# Patient Record
Sex: FEMALE | Race: WHITE | NOT HISPANIC OR LATINO | Employment: OTHER | URBAN - NONMETROPOLITAN AREA
[De-identification: names, ages, dates, MRNs, and addresses within clinical notes are randomized per-mention and may not be internally consistent; named-entity substitution may affect disease eponyms.]

---

## 2020-01-15 ENCOUNTER — OFFICE VISIT (OUTPATIENT)
Dept: CARDIOLOGY | Facility: CLINIC | Age: 60
End: 2020-01-15

## 2020-01-15 VITALS
OXYGEN SATURATION: 96 % | HEIGHT: 62 IN | WEIGHT: 107.8 LBS | DIASTOLIC BLOOD PRESSURE: 69 MMHG | SYSTOLIC BLOOD PRESSURE: 112 MMHG | HEART RATE: 95 BPM | BODY MASS INDEX: 19.84 KG/M2

## 2020-01-15 DIAGNOSIS — R00.2 PALPITATIONS: ICD-10-CM

## 2020-01-15 DIAGNOSIS — R06.02 SHORTNESS OF BREATH: ICD-10-CM

## 2020-01-15 DIAGNOSIS — R07.2 PRECORDIAL PAIN: Primary | ICD-10-CM

## 2020-01-15 PROCEDURE — 93000 ELECTROCARDIOGRAM COMPLETE: CPT | Performed by: PHYSICIAN ASSISTANT

## 2020-01-15 PROCEDURE — 99204 OFFICE O/P NEW MOD 45 MIN: CPT | Performed by: PHYSICIAN ASSISTANT

## 2020-01-15 RX ORDER — MONTELUKAST SODIUM 10 MG/1
1 TABLET ORAL NIGHTLY
COMMUNITY
Start: 2019-11-06

## 2020-01-15 RX ORDER — OMEPRAZOLE 20 MG/1
1 CAPSULE, DELAYED RELEASE ORAL DAILY
COMMUNITY
Start: 2019-12-27

## 2020-01-15 RX ORDER — CHOLECALCIFEROL (VITAMIN D3) 125 MCG
1 CAPSULE ORAL DAILY
COMMUNITY
Start: 2019-12-06

## 2020-01-15 RX ORDER — TRIAMCINOLONE ACETONIDE 1 MG/G
1 CREAM TOPICAL 2 TIMES DAILY
COMMUNITY
End: 2023-03-09 | Stop reason: ALTCHOICE

## 2020-01-15 RX ORDER — LOPERAMIDE HYDROCHLORIDE 2 MG/1
1 CAPSULE ORAL DAILY PRN
COMMUNITY
Start: 2019-12-17

## 2020-01-15 RX ORDER — FEXOFENADINE HCL 180 MG/1
1 TABLET ORAL DAILY
COMMUNITY
Start: 2019-11-06 | End: 2023-03-09 | Stop reason: ALTCHOICE

## 2020-01-15 NOTE — PROGRESS NOTES
Subjective   Nikki Nguyen is a 59 y.o. female     Chief Complaint   Patient presents with   • Establish Care     Here to establish care        HPI    Patient is a 59-year-old female who is being referred to the office in the setting of chest pain.  Patient has a history of coronary disease.  She describes having a stress test in the distant past but has not had any further cardiac evaluation in regards to invasive evaluation.    Patient describes that she has been having atypical chest pain.  She has occasional pleuritic type of chest discomfort.  With inspiration she will notice a sharp pain.  Patient also describes after eating meals that she will notice discomfort when swallowing.  Patient describes not seeing GI.  Dyspnea is mild.  No progressive shortness of breath.  She has normal functional status.  No PND orthopnea.    She also describes palpitations.  These occur at random and described as a forceful heartbeat.  No dizziness presyncope or syncope.  Otherwise patient is doing well      Current Outpatient Medications   Medication Sig Dispense Refill   • Cholecalciferol (VITAMIN D3) 50 MCG (2000 UT) tablet Take 1 tablet by mouth Daily.     • estradiol (CLIMARA) 0.025 MG/24HR patch Place 1 patch on the skin as directed by provider 1 (One) Time Per Week.     • fexofenadine (ALLEGRA) 180 MG tablet Take 1 tablet by mouth Daily.     • Fluticasone Furoate-Vilanterol (BREO ELLIPTA) 100-25 MCG/INH inhaler Inhale 1 puff Daily.     • Lactobacillus (PROBIOTIC GOLD EXTRA STRENGTH PO) Take 1 tablet by mouth 2 (Two) Times a Day.     • loperamide (IMODIUM) 2 MG capsule Take 1 capsule by mouth Daily As Needed.     • montelukast (SINGULAIR) 10 MG tablet Take 1 tablet by mouth Every Night.     • omeprazole (priLOSEC) 20 MG capsule Take 1 capsule by mouth Daily.     • Prenatal Vit-Fe Fumarate-FA (PRENATAL 19 PO) Take 1 tablet by mouth Daily.     • triamcinolone (KENALOG) 0.1 % cream Apply 1 application topically to the  appropriate area as directed 2 (Two) Times a Day.       No current facility-administered medications for this visit.        Patient has no known allergies.    Past Medical History:   Diagnosis Date   • Acid reflux    • Asthma        Social History     Socioeconomic History   • Marital status:      Spouse name: Not on file   • Number of children: Not on file   • Years of education: Not on file   • Highest education level: Not on file   Tobacco Use   • Smoking status: Never Smoker   • Smokeless tobacco: Never Used   Substance and Sexual Activity   • Alcohol use: Never     Frequency: Never   • Drug use: Never   • Sexual activity: Defer       Family History   Problem Relation Age of Onset   • Hypertension Mother    • Diabetes Mother    • Heart disease Mother    • Cirrhosis Father    • Prostate cancer Brother        Review of Systems   Constitutional: Positive for fatigue (stays tired ). Negative for chills and fever.   HENT: Negative.    Eyes: Positive for visual disturbance (glasses daily ).   Respiratory: Positive for cough, chest tightness and shortness of breath (SOA with exertion ).    Cardiovascular: Positive for chest pain (pain and tightness in mid chest for the last week ), palpitations (Racing at times in the last few weeks ) and leg swelling (Puffiness last week, better now ).   Gastrointestinal: Positive for abdominal pain (upper abdominal pain ). Negative for blood in stool, nausea and vomiting.   Endocrine: Negative.  Negative for cold intolerance and heat intolerance.   Genitourinary: Negative.  Negative for dysuria, frequency, hematuria and urgency.   Musculoskeletal: Positive for back pain (low back pain ). Negative for arthralgias.   Skin: Positive for rash (neck and upper back which itches. Currently using Triamcinolone cream on it ). Negative for wound.   Allergic/Immunologic: Positive for environmental allergies. Negative for food allergies.   Neurological: Positive for weakness (off and on  "). Negative for dizziness and light-headedness.   Hematological: Bruises/bleeds easily (bruises easily ).   Psychiatric/Behavioral: Positive for sleep disturbance (has awaken with smothering a few times).   All other systems reviewed and are negative.      Objective   Vitals:    01/15/20 1351   BP: 112/69   BP Location: Left arm   Patient Position: Sitting   Pulse: 95   SpO2: 96%   Weight: 48.9 kg (107 lb 12.8 oz)   Height: 157.5 cm (62\")      /69 (BP Location: Left arm, Patient Position: Sitting)   Pulse 95   Ht 157.5 cm (62\")   Wt 48.9 kg (107 lb 12.8 oz)   SpO2 96%   BMI 19.72 kg/m²     Lab Results (most recent)     None          Physical Exam   Constitutional: She is oriented to person, place, and time. She appears well-developed and well-nourished. No distress.   HENT:   Head: Normocephalic and atraumatic.   Eyes: Conjunctivae are normal. Right eye exhibits no discharge. Left eye exhibits no discharge. No scleral icterus.   Neck: No JVD present.   Cardiovascular: Normal rate, regular rhythm and normal heart sounds. Exam reveals no gallop and no friction rub.   No murmur heard.  Pulmonary/Chest: Effort normal and breath sounds normal. No respiratory distress. She has no wheezes. She has no rales. She exhibits no tenderness.   Musculoskeletal: Normal range of motion. She exhibits no edema.   Neurological: She is alert and oriented to person, place, and time. No cranial nerve deficit.   Skin: Skin is warm and dry. No rash noted. No erythema. No pallor.   Psychiatric: She has a normal mood and affect. Her behavior is normal.   Nursing note and vitals reviewed.      Procedure     ECG 12 Lead  Date/Time: 1/15/2020 2:08 PM  Performed by: Fidel Moe PA  Authorized by: Fidel Moe PA   Previous ECG: no previous ECG available  Comments: EKG demonstrates sinus rhythm at 93 bpm with no acute ST changes                 Assessment/Plan     Problems Addressed this Visit        Cardiovascular and " Mediastinum    Palpitations    Relevant Orders    Adult Transthoracic Echo Complete W/ Cont if Necessary Per Protocol    Cardiac Event Monitor    Treadmill Stress Test       Respiratory    Shortness of breath    Relevant Orders    Adult Transthoracic Echo Complete W/ Cont if Necessary Per Protocol    Cardiac Event Monitor    Treadmill Stress Test       Nervous and Auditory    Precordial pain - Primary    Relevant Orders    ECG 12 Lead    Adult Transthoracic Echo Complete W/ Cont if Necessary Per Protocol    Cardiac Event Monitor    Treadmill Stress Test          Recommendation  1.  Patient with atypical chest pain.  I feel that the way she describes her chest pain, that it is very likely she has a possible underlying GI pathology causing her symptoms.  I discussed about possible referral to GI services.  She will follow-up with primary.  2.  I will perform regular treadmill stress test to exclude a cardiac source of symptoms.  Echocardiogram to ensure normal LV function, normal diastolic function and rule any significant pericardial or great vessel disease  3.  Event monitor because of patient's palpitations to rule out any arrhythmic substrate.  4.  We will see patient back for follow-up after testing.  She will follow with primary as scheduled             Nikki Nguyen  reports that she has never smoked. She has never used smokeless tobacco..         Patient's Body mass index is 19.72 kg/m². BMI is within normal parameters. No follow-up required..         Electronically signed by:

## 2020-01-22 ENCOUNTER — TELEPHONE (OUTPATIENT)
Dept: CARDIOLOGY | Facility: CLINIC | Age: 60
End: 2020-01-22

## 2020-01-22 NOTE — TELEPHONE ENCOUNTER
Pt called stating she received the monitor and put it on, but is having error messages w/ it. I advised her to contact the number on the box and see if they can assist her. She verbalized understanding.

## 2020-01-29 ENCOUNTER — HOSPITAL ENCOUNTER (OUTPATIENT)
Dept: CARDIOLOGY | Facility: HOSPITAL | Age: 60
Discharge: HOME | End: 2020-01-29

## 2020-01-29 ENCOUNTER — HOSPITAL ENCOUNTER (OUTPATIENT)
Dept: CARDIOLOGY | Facility: HOSPITAL | Age: 60
Discharge: HOME | End: 2020-01-29
Admitting: PHYSICIAN ASSISTANT

## 2020-01-29 VITALS — HEIGHT: 62 IN | WEIGHT: 107.81 LBS | BODY MASS INDEX: 19.84 KG/M2

## 2020-01-29 DIAGNOSIS — R07.2 PRECORDIAL PAIN: ICD-10-CM

## 2020-01-29 DIAGNOSIS — R06.02 SHORTNESS OF BREATH: ICD-10-CM

## 2020-01-29 DIAGNOSIS — R00.2 PALPITATIONS: ICD-10-CM

## 2020-01-29 PROCEDURE — 93306 TTE W/DOPPLER COMPLETE: CPT | Performed by: INTERNAL MEDICINE

## 2020-01-29 PROCEDURE — 93016 CV STRESS TEST SUPVJ ONLY: CPT | Performed by: NURSE PRACTITIONER

## 2020-01-29 PROCEDURE — 93306 TTE W/DOPPLER COMPLETE: CPT

## 2020-01-29 PROCEDURE — 93018 CV STRESS TEST I&R ONLY: CPT | Performed by: INTERNAL MEDICINE

## 2020-01-29 PROCEDURE — 93017 CV STRESS TEST TRACING ONLY: CPT

## 2020-02-03 LAB
BH CV STRESS RECOVERY BP: NORMAL MMHG
BH CV STRESS RECOVERY HR: 101 BPM
MAXIMAL PREDICTED HEART RATE: 160 BPM
PERCENT MAX PREDICTED HR: 101.25 %
STRESS BASELINE BP: NORMAL MMHG
STRESS BASELINE HR: 74 BPM
STRESS PERCENT HR: 119 %
STRESS POST ESTIMATED WORKLOAD: 7 METS
STRESS POST EXERCISE DUR MIN: 6 MIN
STRESS POST EXERCISE DUR SEC: 0 SEC
STRESS POST PEAK BP: NORMAL MMHG
STRESS POST PEAK HR: 162 BPM
STRESS TARGET HR: 136 BPM

## 2020-02-16 LAB
BH CV ECHO MEAS - ACS: 1.7 CM
BH CV ECHO MEAS - AO MAX PG (FULL): 3.7 MMHG
BH CV ECHO MEAS - AO MAX PG: 5 MMHG
BH CV ECHO MEAS - AO MEAN PG (FULL): 1 MMHG
BH CV ECHO MEAS - AO MEAN PG: 2 MMHG
BH CV ECHO MEAS - AO ROOT AREA (BSA CORRECTED): 2
BH CV ECHO MEAS - AO ROOT AREA: 7.1 CM^2
BH CV ECHO MEAS - AO ROOT DIAM: 3 CM
BH CV ECHO MEAS - AO V2 MAX: 112 CM/SEC
BH CV ECHO MEAS - AO V2 MEAN: 68.4 CM/SEC
BH CV ECHO MEAS - AO V2 VTI: 25.5 CM
BH CV ECHO MEAS - BSA(HAYCOCK): 1.5 M^2
BH CV ECHO MEAS - BSA: 1.5 M^2
BH CV ECHO MEAS - BZI_BMI: 19.6 KILOGRAMS/M^2
BH CV ECHO MEAS - BZI_METRIC_HEIGHT: 157.5 CM
BH CV ECHO MEAS - BZI_METRIC_WEIGHT: 48.5 KG
BH CV ECHO MEAS - EDV(CUBED): 97.3 ML
BH CV ECHO MEAS - EDV(TEICH): 97.3 ML
BH CV ECHO MEAS - EF(CUBED): 77.7 %
BH CV ECHO MEAS - EF(TEICH): 69.9 %
BH CV ECHO MEAS - ESV(CUBED): 21.7 ML
BH CV ECHO MEAS - ESV(TEICH): 29.3 ML
BH CV ECHO MEAS - FS: 39.3 %
BH CV ECHO MEAS - IVS/LVPW: 1
BH CV ECHO MEAS - IVSD: 0.7 CM
BH CV ECHO MEAS - LA DIMENSION: 3.1 CM
BH CV ECHO MEAS - LA/AO: 1
BH CV ECHO MEAS - LAT PEAK E' VEL: 12.8 CM/SEC
BH CV ECHO MEAS - LV IVRT: 0.1 SEC
BH CV ECHO MEAS - LV MASS(C)D: 97.3 GRAMS
BH CV ECHO MEAS - LV MASS(C)DI: 66.4 GRAMS/M^2
BH CV ECHO MEAS - LV MAX PG: 1.4 MMHG
BH CV ECHO MEAS - LV MEAN PG: 1 MMHG
BH CV ECHO MEAS - LV V1 MAX: 58.1 CM/SEC
BH CV ECHO MEAS - LV V1 MEAN: 32.8 CM/SEC
BH CV ECHO MEAS - LV V1 VTI: 11.4 CM
BH CV ECHO MEAS - LVIDD: 4.6 CM
BH CV ECHO MEAS - LVIDS: 2.8 CM
BH CV ECHO MEAS - LVPWD: 0.68 CM
BH CV ECHO MEAS - MED PEAK E' VEL: 7.6 CM/SEC
BH CV ECHO MEAS - MR MAX PG: 9.9 MMHG
BH CV ECHO MEAS - MR MAX VEL: 157 CM/SEC
BH CV ECHO MEAS - MV A MAX VEL: 88.1 CM/SEC
BH CV ECHO MEAS - MV DEC SLOPE: 358 CM/SEC^2
BH CV ECHO MEAS - MV DEC TIME: 0.23 SEC
BH CV ECHO MEAS - MV E MAX VEL: 77.1 CM/SEC
BH CV ECHO MEAS - MV E/A: 0.88
BH CV ECHO MEAS - MV MAX PG: 2.7 MMHG
BH CV ECHO MEAS - MV MEAN PG: 2 MMHG
BH CV ECHO MEAS - MV P1/2T MAX VEL: 92.7 CM/SEC
BH CV ECHO MEAS - MV P1/2T: 75.8 MSEC
BH CV ECHO MEAS - MV V2 MAX: 81.8 CM/SEC
BH CV ECHO MEAS - MV V2 MEAN: 58.5 CM/SEC
BH CV ECHO MEAS - MV V2 VTI: 23.3 CM
BH CV ECHO MEAS - MVA P1/2T LCG: 2.4 CM^2
BH CV ECHO MEAS - MVA(P1/2T): 2.9 CM^2
BH CV ECHO MEAS - PA MAX PG (FULL): -0.16 MMHG
BH CV ECHO MEAS - PA MAX PG: 1.9 MMHG
BH CV ECHO MEAS - PA MEAN PG (FULL): 0 MMHG
BH CV ECHO MEAS - PA MEAN PG: 1 MMHG
BH CV ECHO MEAS - PA V2 MAX: 68.9 CM/SEC
BH CV ECHO MEAS - PA V2 MEAN: 46.8 CM/SEC
BH CV ECHO MEAS - PA V2 VTI: 15 CM
BH CV ECHO MEAS - RV MAX PG: 2.1 MMHG
BH CV ECHO MEAS - RV MEAN PG: 1 MMHG
BH CV ECHO MEAS - RV V1 MAX: 71.8 CM/SEC
BH CV ECHO MEAS - RV V1 MEAN: 45.7 CM/SEC
BH CV ECHO MEAS - RV V1 VTI: 15.4 CM
BH CV ECHO MEAS - RVDD: 2 CM
BH CV ECHO MEAS - SI(AO): 123 ML/M^2
BH CV ECHO MEAS - SI(CUBED): 51.6 ML/M^2
BH CV ECHO MEAS - SI(TEICH): 46.4 ML/M^2
BH CV ECHO MEAS - SV(AO): 180.2 ML
BH CV ECHO MEAS - SV(CUBED): 75.6 ML
BH CV ECHO MEAS - SV(TEICH): 68 ML
BH CV ECHO MEASUREMENTS AVERAGE E/E' RATIO: 7.56
MAXIMAL PREDICTED HEART RATE: 160 BPM
STRESS TARGET HR: 136 BPM

## 2020-02-17 ENCOUNTER — TELEPHONE (OUTPATIENT)
Dept: CARDIOLOGY | Facility: CLINIC | Age: 60
End: 2020-02-17

## 2020-02-17 NOTE — TELEPHONE ENCOUNTER
1503 PM  Patient notified of recent echo results.  Discussed provider recommendations.  Patient verbalizes understanding.  No questions at this time.  2/17/20 rosalee KLINE

## 2020-03-18 ENCOUNTER — OFFICE VISIT (OUTPATIENT)
Dept: CARDIOLOGY | Facility: CLINIC | Age: 60
End: 2020-03-18

## 2020-03-18 VITALS
BODY MASS INDEX: 20.72 KG/M2 | DIASTOLIC BLOOD PRESSURE: 66 MMHG | OXYGEN SATURATION: 95 % | HEART RATE: 86 BPM | SYSTOLIC BLOOD PRESSURE: 104 MMHG | WEIGHT: 112.6 LBS | HEIGHT: 62 IN

## 2020-03-18 DIAGNOSIS — R00.2 PALPITATIONS: ICD-10-CM

## 2020-03-18 DIAGNOSIS — R07.9 CHEST PAIN, UNSPECIFIED TYPE: ICD-10-CM

## 2020-03-18 DIAGNOSIS — R06.02 SHORTNESS OF BREATH: Primary | ICD-10-CM

## 2020-03-18 PROCEDURE — 99214 OFFICE O/P EST MOD 30 MIN: CPT | Performed by: PHYSICIAN ASSISTANT

## 2020-03-18 RX ORDER — NITROGLYCERIN 0.4 MG/1
TABLET SUBLINGUAL
Qty: 25 TABLET | Refills: 3 | Status: SHIPPED | OUTPATIENT
Start: 2020-03-18 | End: 2023-03-09 | Stop reason: SDUPTHER

## 2020-03-18 RX ORDER — METOPROLOL TARTRATE 100 MG/1
TABLET ORAL
Qty: 2 TABLET | Refills: 0 | Status: SHIPPED | OUTPATIENT
Start: 2020-03-18 | End: 2020-05-14

## 2020-03-18 NOTE — PROGRESS NOTES
Problem list     Subjective   Nikki Nguyen is a 59 y.o. female     Chief Complaint   Patient presents with   • Chest Pain     presents for stress, echo and monitor f/u   • Palpitations   • Shortness of Breath       HPI    Patient is a 59-year-old female that presents back to the office for follow-up.  We initially saw the patient for atypical pain in which she described as substernal and retrosternal discomfort.  It would occur at random.  She underwent GI evaluation and has he had to follow-up as she underwent endoscopy due to high suspicion of GI source of symptoms.  We ordered cardiac evaluation to include regular treadmill stress test which was normal.  Echocardiogram was largely normal and event monitor did not demonstrate any significant arrhythmias.    She continues to feel retrosternal discomfort.  She describes this pressure at random and can occur at rest.  She has mild stable levels of exertional dyspnea without progressive shortness of breath.  No PND orthopnea.    Palpitations still occur.  These occur at random.  No associated dizziness presyncope or syncope.  Otherwise patient is doing well      Current Outpatient Medications on File Prior to Visit   Medication Sig Dispense Refill   • Cholecalciferol (VITAMIN D3) 50 MCG (2000 UT) tablet Take 1 tablet by mouth Daily.     • estradiol (CLIMARA) 0.025 MG/24HR patch Place 1 patch on the skin as directed by provider 1 (One) Time Per Week.     • fexofenadine (ALLEGRA) 180 MG tablet Take 1 tablet by mouth Daily.     • Fluticasone Furoate-Vilanterol (BREO ELLIPTA) 100-25 MCG/INH inhaler Inhale 1 puff Daily.     • Lactobacillus (PROBIOTIC GOLD EXTRA STRENGTH PO) Take 1 tablet by mouth 2 (Two) Times a Day.     • loperamide (IMODIUM) 2 MG capsule Take 1 capsule by mouth Daily As Needed.     • montelukast (SINGULAIR) 10 MG tablet Take 1 tablet by mouth Every Night.     • omeprazole (priLOSEC) 20 MG capsule Take 1 capsule by mouth Daily.     • Prenatal Vit-Fe  Fumarate-FA (PRENATAL 19 PO) Take 1 tablet by mouth Daily.     • triamcinolone (KENALOG) 0.1 % cream Apply 1 application topically to the appropriate area as directed 2 (Two) Times a Day.     • nystatin (MYCOSTATIN) 689948 UNIT/ML suspension 5 mL Every 6 (Six) Hours.       No current facility-administered medications on file prior to visit.        Patient has no known allergies.    Past Medical History:   Diagnosis Date   • Acid reflux    • Asthma        Social History     Socioeconomic History   • Marital status:      Spouse name: Not on file   • Number of children: Not on file   • Years of education: Not on file   • Highest education level: Not on file   Tobacco Use   • Smoking status: Never Smoker   • Smokeless tobacco: Never Used   Substance and Sexual Activity   • Alcohol use: Never     Frequency: Never   • Drug use: Never   • Sexual activity: Defer       Family History   Problem Relation Age of Onset   • Hypertension Mother    • Diabetes Mother    • Heart disease Mother    • Cirrhosis Father    • Prostate cancer Brother        Review of Systems   Constitutional: Positive for diaphoresis (night sweats) and fatigue.   HENT: Negative.    Respiratory: Positive for shortness of breath (with increased activity).    Cardiovascular: Positive for chest pain (midsternal pain, may be due to stomach problems) and palpitations (occas racing). Negative for leg swelling.   Gastrointestinal: Positive for abdominal pain and diarrhea.        Seeing Dr Manriquez, recent testing done   Endocrine: Negative.    Genitourinary: Negative.    Musculoskeletal: Negative.    Skin: Negative.    Allergic/Immunologic: Negative.    Neurological: Positive for weakness.   Hematological: Bruises/bleeds easily (bruise).   Psychiatric/Behavioral: Positive for agitation and sleep disturbance ( insomnia ). The patient is nervous/anxious.    All other systems reviewed and are negative.      Objective   Vitals:    03/18/20 1320   BP: 104/66   BP  "Location: Left arm   Patient Position: Sitting   Pulse: 86   SpO2: 95%   Weight: 51.1 kg (112 lb 9.6 oz)   Height: 157 cm (61.81\")      /66 (BP Location: Left arm, Patient Position: Sitting)   Pulse 86   Ht 157 cm (61.81\")   Wt 51.1 kg (112 lb 9.6 oz)   SpO2 95%   BMI 20.72 kg/m²     Lab Results (most recent)     None          Physical Exam   Constitutional: She is oriented to person, place, and time. She appears well-developed and well-nourished. No distress.   HENT:   Head: Normocephalic and atraumatic.   Eyes: Conjunctivae are normal. Right eye exhibits no discharge. Left eye exhibits no discharge. No scleral icterus.   Neck: No JVD present.   Cardiovascular: Normal rate, regular rhythm and normal heart sounds. Exam reveals no gallop and no friction rub.   No murmur heard.  Pulmonary/Chest: Effort normal and breath sounds normal. No respiratory distress. She has no wheezes. She has no rales. She exhibits no tenderness.   Musculoskeletal: Normal range of motion. She exhibits no edema.   Neurological: She is alert and oriented to person, place, and time. No cranial nerve deficit.   Skin: Skin is warm and dry. No rash noted. No erythema. No pallor.   Psychiatric: She has a normal mood and affect. Her behavior is normal.   Nursing note and vitals reviewed.      Procedure   Procedures       Assessment/Plan     Problems Addressed this Visit        Cardiovascular and Mediastinum    Palpitations    Relevant Orders    CT Angiogram Heart With 3D Image       Respiratory    Shortness of breath - Primary    Relevant Orders    CT Angiogram Heart With 3D Image       Nervous and Auditory    Chest pain    Relevant Orders    CT Angiogram Heart With 3D Image            Recommendation  1.  Patient with complaints of chest pain and dyspnea.  Chest pain has been persistent secondary causes have been evaluated.  I would like to order more invasive imaging to look to see if ischemia is a contributing factor to her symptoms.  " I will schedule cardiac CTA.  2.  Patient with persistent mild levels of dyspnea and palpitations that occur but no significant ectopy on event monitoring and patient has good LV function.  We will order the above testing and see her back for follow-up.  She will follow with primary as scheduled         Nikki Patrick  reports that she has never smoked. She has never used smokeless tobacco..        Patient's Body mass index is 20.72 kg/m². BMI is within normal parameters. No follow-up required..       Electronically signed by:

## 2020-03-18 NOTE — PATIENT INSTRUCTIONS

## 2020-04-15 ENCOUNTER — OFFICE VISIT (OUTPATIENT)
Dept: CARDIOLOGY | Facility: CLINIC | Age: 60
End: 2020-04-15

## 2020-04-15 VITALS
HEIGHT: 62 IN | SYSTOLIC BLOOD PRESSURE: 105 MMHG | DIASTOLIC BLOOD PRESSURE: 71 MMHG | BODY MASS INDEX: 19.69 KG/M2 | WEIGHT: 107 LBS | HEART RATE: 92 BPM

## 2020-04-15 DIAGNOSIS — R06.02 SHORTNESS OF BREATH: Primary | ICD-10-CM

## 2020-04-15 DIAGNOSIS — R07.9 CHEST PAIN, UNSPECIFIED TYPE: ICD-10-CM

## 2020-04-15 DIAGNOSIS — R00.2 PALPITATIONS: ICD-10-CM

## 2020-04-15 PROCEDURE — 99441 PR PHYS/QHP TELEPHONE EVALUATION 5-10 MIN: CPT | Performed by: PHYSICIAN ASSISTANT

## 2020-04-15 NOTE — PATIENT INSTRUCTIONS

## 2020-04-15 NOTE — PROGRESS NOTES
Problem list     Subjective   Nikki Nguyen is a 59 y.o. female     Chief Complaint   Patient presents with   • Follow-up     You have chosen to receive care through a telephone visit. Do you consent to use a telephone visit for your medical care today? Yes    HPI    Patient is a 59-year-old female that is being interviewed telephonically due to the recent global pandemic.    Atypical symptoms of chest pain dyspnea which prompted cardiac evaluation in January February which included regular treadmill stress test which was normal and echo that was largely normal as well.  Event monitor showed sinus rhythm without significant ectopy.    She continued to complain of symptoms last time I saw her and to exclude a cardiac source CTA of the heart was ordered although because of recent global pandemic, this is been placed on hold.    Patient describes to me that her chest pain occurs frequently at night.  She also describes that when she swallows food that sometimes she will have discomfort.  This happens quite often.  She has it frequently at night that causes her to sit up.  She had endoscopy performed by Dr. Branch in March last month.  Apparently she had biopsy as well.  I currently have not seen results.    She is doing well otherwise.  Symptoms were otherwise remained stable but she continues to have the discomfort      Current Outpatient Medications on File Prior to Visit   Medication Sig Dispense Refill   • Cholecalciferol (VITAMIN D3) 50 MCG (2000 UT) tablet Take 1 tablet by mouth Daily.     • estradiol (CLIMARA) 0.025 MG/24HR patch Place 1 patch on the skin as directed by provider 1 (One) Time Per Week.     • fexofenadine (ALLEGRA) 180 MG tablet Take 1 tablet by mouth Daily.     • Fluticasone Furoate-Vilanterol (BREO ELLIPTA) 100-25 MCG/INH inhaler Inhale 1 puff Daily.     • Lactobacillus (PROBIOTIC GOLD EXTRA STRENGTH PO) Take 1 tablet by mouth 2 (Two) Times a Day.     • loperamide (IMODIUM) 2 MG capsule Take 1  "capsule by mouth Daily As Needed.     • Loratadine (CLARITIN PO) Take  by mouth.     • metoprolol tartrate (LOPRESSOR) 100 MG tablet Take 1 tablet by mouth 1 hour prior to Cardiac CTA and take other tablet to Radiology Dept. 2 tablet 0   • nitroglycerin (NITROSTAT) 0.4 MG SL tablet 1 under the tongue as needed for angina, may repeat q5mins for up three doses 25 tablet 3   • nystatin (MYCOSTATIN) [id number] UNIT/ML suspension 5 mL Every 6 (Six) Hours.     • omeprazole (priLOSEC) 20 MG capsule Take 1 capsule by mouth Daily.     • Prenatal Vit-Fe Fumarate-FA (PRENATAL 19 PO) Take 1 tablet by mouth Daily.     • triamcinolone (KENALOG) 0.1 % cream Apply 1 application topically to the appropriate area as directed 2 (Two) Times a Day.     • montelukast (SINGULAIR) 10 MG tablet Take 1 tablet by mouth Every Night.       No current facility-administered medications on file prior to visit.        Patient has no known allergies.    Past Medical History:   Diagnosis Date   • Acid reflux    • Asthma        Social History     Socioeconomic History   • Marital status:      Spouse name: Not on file   • Number of children: Not on file   • Years of education: Not on file   • Highest education level: Not on file   Tobacco Use   • Smoking status: Never Smoker   • Smokeless tobacco: Never Used   Substance and Sexual Activity   • Alcohol use: Never     Frequency: Never   • Drug use: Never   • Sexual activity: Defer       Family History   Problem Relation Age of Onset   • Hypertension Mother    • Diabetes Mother    • Heart disease Mother    • Cirrhosis Father    • Prostate cancer Brother        Review of Systems   Constitutional: Positive for fatigue.   HENT: Positive for sneezing. Negative for congestion, rhinorrhea and sore throat.    Eyes: Positive for visual disturbance (glasses daily ).   Respiratory: Positive for shortness of breath (sometimes when asthma \"acts up\" ). Negative for chest tightness.    Cardiovascular: Positive for " "chest pain (Denies CP). Negative for palpitations and leg swelling.   Gastrointestinal: Positive for diarrhea (a few days ago ). Negative for abdominal pain, blood in stool, constipation, nausea and vomiting.   Endocrine: Negative for cold intolerance and heat intolerance.        Pt c/o hot flashes     Genitourinary: Negative for difficulty urinating, dysuria, frequency, hematuria and urgency.   Musculoskeletal: Positive for arthralgias, back pain (around shoulders) and neck pain.   Skin: Negative for rash and wound.   Allergic/Immunologic: Positive for environmental allergies (seasonal ). Negative for food allergies.   Neurological: Positive for numbness (hands) and headaches. Negative for dizziness, syncope, weakness and light-headedness.   Hematological: Bruises/bleeds easily (bruises).   Psychiatric/Behavioral: Positive for sleep disturbance (Issues staying asleep, sometimes has issues w/ SoA at HS, never tested for VIANEY).   All other systems reviewed and are negative.      Objective   Vitals:    04/15/20 1446   BP: 105/71   BP Location: Left arm   Patient Position: Sitting   Pulse: 92   Weight: 48.5 kg (107 lb)   Height: 157.5 cm (62\")      /71 (BP Location: Left arm, Patient Position: Sitting)   Pulse 92   Ht 157.5 cm (62\")   Wt 48.5 kg (107 lb) Comment: stated  BMI 19.57 kg/m²     Lab Results (most recent)     None          Physical Exam    Procedure   Procedures       Assessment/Plan     Problems Addressed this Visit        Cardiovascular and Mediastinum    Palpitations       Respiratory    Shortness of breath - Primary       Nervous and Auditory    Chest pain          Recommendation  1.  Patient is a 59-year-old female with atypical chest pain and by description I am very suspicious of GI etiologies.  She has been referred to a GI specialist apparently.  I do feel that I would consider that is the cause of her symptoms based on description.  We can always consider cardiac CTA to exclude a potential " cardiac source although her symptoms do not sound anginal by description  2.  Again other symptoms are mild including palpitations and dyspnea work-up has been otherwise benign.  I want to see her back for follow-up in 1 to 2 months.  If symptoms change, she will call our office and she has nitroglycerin available.  She will follow-up with GI services.  We will see her again for follow-up to reevaluate.  She will follow with primary as scheduled           Nikki Nguyen  reports that she has never smoked. She has never used smokeless tobacco.     Patient's Body mass index is 19.57 kg/m². BMI is within normal parameters. No follow-up required..     This visit has been rescheduled as a phone visit to comply with patient safety concerns in accordance with CDC recommendations. Total time of discussion was 5 minutes.      Electronically signed by:

## 2020-05-14 ENCOUNTER — OFFICE VISIT (OUTPATIENT)
Dept: CARDIOLOGY | Facility: CLINIC | Age: 60
End: 2020-05-14

## 2020-05-14 VITALS
WEIGHT: 110 LBS | HEIGHT: 62 IN | DIASTOLIC BLOOD PRESSURE: 78 MMHG | BODY MASS INDEX: 20.24 KG/M2 | HEART RATE: 109 BPM | SYSTOLIC BLOOD PRESSURE: 124 MMHG

## 2020-05-14 DIAGNOSIS — R07.9 CHEST PAIN, UNSPECIFIED TYPE: ICD-10-CM

## 2020-05-14 DIAGNOSIS — R00.2 PALPITATIONS: ICD-10-CM

## 2020-05-14 DIAGNOSIS — R06.02 SHORTNESS OF BREATH: Primary | ICD-10-CM

## 2020-05-14 PROCEDURE — 99441 PR PHYS/QHP TELEPHONE EVALUATION 5-10 MIN: CPT | Performed by: PHYSICIAN ASSISTANT

## 2020-05-14 NOTE — PATIENT INSTRUCTIONS

## 2020-05-14 NOTE — PROGRESS NOTES
Problem list     Subjective   Nikki Nguyen is a 59 y.o. female     Chief Complaint   Patient presents with   • Follow-up   Problem list  1.  Chest pain  1.1 stress test February 2020 demonstrated normal regular treadmill stress test without abnormality  2.  Preserved systolic function  3.  Palpitations  3.1 event monitor January 2020 showed no significant arrhythmia or ectopy  4.  COPD and asthma    You have chosen to receive care through a telephone visit. Do you consent to use a telephone visit for your medical care today? Yes    HPI    Patient is a 59-year-old female that is being interviewed telephonically due to the recent level pandemic.    Last office visit we reviewed patient's testing.  She basically had normal testing.  She described pain after she would eat food or swallow.  She has since seen GI services.  She is feeling better.    She has occasional discomfort like as above but nothing that has been severe progressive.  Dyspnea is mild at baseline.  She is doing well on her inhalers for her lung disease.  She does not describe PND orthopnea.    She does palpitate at times.  She notices her heart rate fluctuating at times but otherwise she is doing well    Current Outpatient Medications on File Prior to Visit   Medication Sig Dispense Refill   • Cholecalciferol (VITAMIN D3) 50 MCG (2000 UT) tablet Take 1 tablet by mouth Daily.     • estradiol (CLIMARA) 0.025 MG/24HR patch Place 1 patch on the skin as directed by provider 1 (One) Time Per Week.     • fexofenadine (ALLEGRA) 180 MG tablet Take 1 tablet by mouth Daily.     • Fluticasone Furoate-Vilanterol (BREO ELLIPTA) 100-25 MCG/INH inhaler Inhale 1 puff Daily.     • loperamide (IMODIUM) 2 MG capsule Take 1 capsule by mouth Daily As Needed.     • Loratadine (CLARITIN PO) Take  by mouth.     • montelukast (SINGULAIR) 10 MG tablet Take 1 tablet by mouth Every Night.     • nitroglycerin (NITROSTAT) 0.4 MG SL tablet 1 under the tongue as needed for angina,  may repeat q5mins for up three doses 25 tablet 3   • nystatin (MYCOSTATIN) 401419 UNIT/ML suspension 5 mL Every 6 (Six) Hours.     • omeprazole (priLOSEC) 20 MG capsule Take 1 capsule by mouth Daily.     • Prenatal Vit-Fe Fumarate-FA (PRENATAL 19 PO) Take 1 tablet by mouth Daily.     • triamcinolone (KENALOG) 0.1 % cream Apply 1 application topically to the appropriate area as directed 2 (Two) Times a Day.     • Lactobacillus (PROBIOTIC GOLD EXTRA STRENGTH PO) Take 1 tablet by mouth 2 (Two) Times a Day.     • [DISCONTINUED] metoprolol tartrate (LOPRESSOR) 100 MG tablet Take 1 tablet by mouth 1 hour prior to Cardiac CTA and take other tablet to Radiology Dept. 2 tablet 0     No current facility-administered medications on file prior to visit.        Patient has no known allergies.    Past Medical History:   Diagnosis Date   • Acid reflux    • Asthma        Social History     Socioeconomic History   • Marital status:      Spouse name: Not on file   • Number of children: Not on file   • Years of education: Not on file   • Highest education level: Not on file   Tobacco Use   • Smoking status: Never Smoker   • Smokeless tobacco: Never Used   Substance and Sexual Activity   • Alcohol use: Never     Frequency: Never   • Drug use: Never   • Sexual activity: Defer       Family History   Problem Relation Age of Onset   • Hypertension Mother    • Diabetes Mother    • Heart disease Mother    • Cirrhosis Father    • Prostate cancer Brother        Review of Systems   Constitutional: Positive for fatigue.   HENT: Negative for congestion, rhinorrhea and sore throat.    Eyes: Positive for visual disturbance (glasses daily ).   Respiratory: Positive for shortness of breath. Negative for chest tightness.    Cardiovascular: Positive for chest pain (Denies CP) and palpitations. Negative for leg swelling.   Gastrointestinal: Negative for abdominal pain, blood in stool, constipation, diarrhea, nausea and vomiting.   Genitourinary:  "Negative for difficulty urinating, dysuria, frequency, hematuria and urgency.   Musculoskeletal: Negative for arthralgias, back pain and neck pain.   Skin: Positive for rash (on her forehead). Negative for wound.   Allergic/Immunologic: Positive for environmental allergies (seasonal ). Negative for food allergies.   Neurological: Positive for headaches (daily ). Negative for dizziness, syncope, weakness, light-headedness and numbness.   Hematological: Does not bruise/bleed easily.   Psychiatric/Behavioral: Negative for sleep disturbance.       Objective   Vitals:    05/14/20 1300   BP: 124/78   Pulse: 109   Weight: 49.9 kg (110 lb)   Height: 157.5 cm (62\")      /78   Pulse 109   Ht 157.5 cm (62\")   Wt 49.9 kg (110 lb) Comment: stated  BMI 20.12 kg/m²     Lab Results (most recent)     None          Physical Exam    Procedure   Procedures       Assessment/Plan     Problems Addressed this Visit        Cardiovascular and Mediastinum    Palpitations       Respiratory    Shortness of breath - Primary       Nervous and Auditory    Chest pain          Recommendation  1.  Patient with chest pain and shortness of breath.  Chest pain is atypical and I do feel GI is likely the etiology.  Stress test was negative echo was normal in regards to gross abnormalities.  2.  Palpitations are mild and event monitor did not demonstrate any significant arrhythmia.  She does not describe presyncope or syncope.  I feel that her lung disease is likely causing some degree of fluctuations in her heart rate.  She feels anxiety is as well.  For now we will monitor symptoms and see her back for follow-up in 2 to 3 months.  If symptoms were to change or worsen, she can contact our office.  She will follow with primary as scheduled           Nikki Nguyen  reports that she has never smoked. She has never used smokeless tobacco.     Patient's Body mass index is 20.12 kg/m². BMI is within normal parameters. No follow-up required..     This " visit has been rescheduled as a phone visit to comply with patient safety concerns in accordance with CDC recommendations. Total time of discussion was approximately 6 minutes.    Electronically signed by:

## 2020-08-17 ENCOUNTER — OFFICE VISIT (OUTPATIENT)
Dept: CARDIOLOGY | Facility: CLINIC | Age: 60
End: 2020-08-17

## 2020-08-17 VITALS
TEMPERATURE: 97.7 F | OXYGEN SATURATION: 97 % | WEIGHT: 107 LBS | HEIGHT: 62 IN | RESPIRATION RATE: 12 BRPM | DIASTOLIC BLOOD PRESSURE: 69 MMHG | SYSTOLIC BLOOD PRESSURE: 105 MMHG | HEART RATE: 106 BPM | BODY MASS INDEX: 19.69 KG/M2

## 2020-08-17 DIAGNOSIS — R07.9 CHEST PAIN, UNSPECIFIED TYPE: ICD-10-CM

## 2020-08-17 DIAGNOSIS — R00.0 FAST HEART BEAT: Primary | ICD-10-CM

## 2020-08-17 DIAGNOSIS — R06.02 SHORTNESS OF BREATH: ICD-10-CM

## 2020-08-17 DIAGNOSIS — R00.2 PALPITATIONS: ICD-10-CM

## 2020-08-17 PROCEDURE — 99213 OFFICE O/P EST LOW 20 MIN: CPT | Performed by: PHYSICIAN ASSISTANT

## 2020-08-17 PROCEDURE — 93000 ELECTROCARDIOGRAM COMPLETE: CPT | Performed by: PHYSICIAN ASSISTANT

## 2020-08-17 NOTE — PROGRESS NOTES
Problem list     Subjective   Nikki Nguyen is a 59 y.o. female     Chief Complaint   Patient presents with   • Shortness of Breath     Occasional episodes. Pt stated it is d/t asthma.   Problem list  1.  Chest pain  1.1 stress test February 2020 demonstrated normal regular treadmill stress test without abnormality  2.  Preserved systolic function  3.  Palpitations  3.1 event monitor January 2020 showed no significant arrhythmia or ectopy  4.  COPD and asthma    HPI    Patient is a 59-year-old female that presents back to the office for follow-up.  Patient has been doing well.  Occasionally she will experience a sharp pain but nothing that has been progressive or changing.  It is atypical by description.  It has not progressed or changed in any way.  It is on occasion and she does not describe radiation referral nausea diaphoresis.  Patient does have shortness of breath but has history of asthma.  She does not describe progressive shortness of breath or declining functional status.  She does not describe PND orthopnea.    She will palpitate on occasion.  She does not describe progressive palpitations.  She does not describe symptoms of cerebral embolic events.  She does not describe presyncope or syncope.  Otherwise she feels relatively stable      Current Outpatient Medications on File Prior to Visit   Medication Sig Dispense Refill   • Cholecalciferol (VITAMIN D3) 50 MCG (2000 UT) tablet Take 1 tablet by mouth Daily.     • estradiol (CLIMARA) 0.025 MG/24HR patch Place 1 patch on the skin as directed by provider 1 (One) Time Per Week.     • fexofenadine (ALLEGRA) 180 MG tablet Take 1 tablet by mouth Daily.     • Fluticasone Furoate-Vilanterol (BREO ELLIPTA) 100-25 MCG/INH inhaler Inhale 1 puff Daily.     • Lactobacillus (PROBIOTIC GOLD EXTRA STRENGTH PO) Take 1 tablet by mouth 2 (Two) Times a Day.     • loperamide (IMODIUM) 2 MG capsule Take 1 capsule by mouth Daily As Needed.     • Loratadine (CLARITIN PO) Take   by mouth.     • montelukast (SINGULAIR) 10 MG tablet Take 1 tablet by mouth Every Night.     • nitroglycerin (NITROSTAT) 0.4 MG SL tablet 1 under the tongue as needed for angina, may repeat q5mins for up three doses 25 tablet 3   • nystatin (MYCOSTATIN) 732354 UNIT/ML suspension 5 mL Every 6 (Six) Hours.     • omeprazole (priLOSEC) 20 MG capsule Take 1 capsule by mouth Daily.     • Prenatal Vit-Fe Fumarate-FA (PRENATAL 19 PO) Take 1 tablet by mouth Daily.     • triamcinolone (KENALOG) 0.1 % cream Apply 1 application topically to the appropriate area as directed 2 (Two) Times a Day.       No current facility-administered medications on file prior to visit.        Patient has no known allergies.    Past Medical History:   Diagnosis Date   • Acid reflux    • Asthma        Social History     Socioeconomic History   • Marital status:      Spouse name: Not on file   • Number of children: Not on file   • Years of education: Not on file   • Highest education level: Not on file   Tobacco Use   • Smoking status: Never Smoker   • Smokeless tobacco: Never Used   Substance and Sexual Activity   • Alcohol use: Never     Frequency: Never   • Drug use: Never   • Sexual activity: Defer       Family History   Problem Relation Age of Onset   • Hypertension Mother    • Diabetes Mother    • Heart disease Mother    • Cirrhosis Father    • Prostate cancer Brother        Review of Systems   Constitutional: Positive for fatigue.   Eyes: Positive for visual disturbance.        Occasional blurred vision. Not UTD with eye exam.   Respiratory: Positive for shortness of breath. Negative for chest tightness.    Cardiovascular: Positive for chest pain, palpitations and leg swelling.        Occasional epeisodes of CP, bilateral leg swelling, and heart palpitations.   Gastrointestinal: Negative.  Negative for abdominal pain, nausea and vomiting.   Endocrine: Negative.    Genitourinary: Negative.    Musculoskeletal: Positive for arthralgias  "and myalgias.   Skin: Negative.    Allergic/Immunologic: Positive for environmental allergies.   Neurological: Positive for weakness and headaches. Negative for dizziness, syncope, facial asymmetry and light-headedness.   Hematological: Bruises/bleeds easily.   Psychiatric/Behavioral: Negative for confusion and sleep disturbance. The patient is nervous/anxious.        Objective   Vitals:    08/17/20 1501   BP: 105/69   BP Location: Left arm   Patient Position: Sitting   Cuff Size: Adult   Pulse: 106   Resp: 12   Temp: 97.7 °F (36.5 °C)   TempSrc: Temporal   SpO2: 97%   Weight: 48.5 kg (107 lb)   Height: 157.5 cm (62.01\")      /69 (BP Location: Left arm, Patient Position: Sitting, Cuff Size: Adult)   Pulse 106   Temp 97.7 °F (36.5 °C) (Temporal)   Resp 12   Ht 157.5 cm (62.01\")   Wt 48.5 kg (107 lb)   SpO2 97%   Breastfeeding No   BMI 19.57 kg/m²     Lab Results (most recent)     None          Physical Exam   Constitutional: She is oriented to person, place, and time. She appears well-developed and well-nourished. No distress.   HENT:   Head: Normocephalic and atraumatic.   Eyes: Conjunctivae are normal. Right eye exhibits no discharge. Left eye exhibits no discharge. No scleral icterus.   Neck: No JVD present.   Cardiovascular: Normal rate, regular rhythm and normal heart sounds. Exam reveals no gallop and no friction rub.   No murmur heard.  Pulmonary/Chest: Effort normal and breath sounds normal. No respiratory distress. She has no wheezes. She has no rales. She exhibits no tenderness.   Musculoskeletal: Normal range of motion. She exhibits no edema.   Neurological: She is alert and oriented to person, place, and time. No cranial nerve deficit.   Skin: Skin is warm and dry. No rash noted. No erythema. No pallor.   Psychiatric: She has a normal mood and affect. Her behavior is normal.   Nursing note and vitals reviewed.      Procedure     ECG 12 Lead  Date/Time: 8/17/2020 3:07 PM  Performed by: " Fidel Moe PA  Authorized by: Fidel Moe PA   Comparison: compared with previous ECG from 1/15/2020  Comments: EKG demonstrates sinus rhythm at 97 bpm with no acute ST changes               Assessment/Plan     Problems Addressed this Visit        Cardiovascular and Mediastinum    Palpitations       Respiratory    Shortness of breath       Nervous and Auditory    Chest pain      Other Visit Diagnoses     Fast heart beat    -  Primary    Relevant Orders    ECG 12 Lead            Recommendation  1.  Patient with atypical chest pain at this time.  It has not been progressive or changing and stress test was negative.  For now she will monitor.  If symptoms were to worsen or change, as discussed, she is to contact our office    2.  Palpitations but no significant ectopy otherwise.  For now we will continue to monitor    3.  Dyspnea is very mild.  She has history of chronic lung disease and has good LV function with grade 1 diastolic dysfunction.  For now nothing further we will see her back in a year or sooner as symptoms discussed with the patient.  She is to follow with primary as scheduled         Nikki Nguyen  reports that she has never smoked. She has never used smokeless tobacco.          Patient's Body mass index is 19.57 kg/m². BMI is within normal parameters. No follow-up required..       Electronically signed by:

## 2023-01-09 ENCOUNTER — TELEPHONE (OUTPATIENT)
Dept: CARDIOLOGY | Facility: CLINIC | Age: 63
End: 2023-01-09
Payer: COMMERCIAL

## 2023-01-09 NOTE — TELEPHONE ENCOUNTER
Caller: [facility]    Best call back number: [phone]     What form or medical record are you requesting: OFFICE NOTE FROM 8.17.20     Who is requesting this form or medical record from you: DELIO EARLY     How would you like to receive the form or medical records (pick-up, mail, fax):   If fax, what is the fax number: [phone]

## 2023-03-09 ENCOUNTER — LAB (OUTPATIENT)
Dept: LAB | Facility: HOSPITAL | Age: 63
End: 2023-03-09
Payer: COMMERCIAL

## 2023-03-09 ENCOUNTER — OFFICE VISIT (OUTPATIENT)
Dept: CARDIOLOGY | Facility: CLINIC | Age: 63
End: 2023-03-09
Payer: COMMERCIAL

## 2023-03-09 VITALS
HEART RATE: 94 BPM | HEIGHT: 63 IN | WEIGHT: 129 LBS | OXYGEN SATURATION: 96 % | DIASTOLIC BLOOD PRESSURE: 71 MMHG | BODY MASS INDEX: 22.86 KG/M2 | SYSTOLIC BLOOD PRESSURE: 109 MMHG

## 2023-03-09 DIAGNOSIS — R06.02 SHORTNESS OF BREATH: ICD-10-CM

## 2023-03-09 DIAGNOSIS — R00.2 PALPITATIONS: ICD-10-CM

## 2023-03-09 DIAGNOSIS — R07.9 CHEST PAIN, UNSPECIFIED TYPE: ICD-10-CM

## 2023-03-09 DIAGNOSIS — E78.5 DYSLIPIDEMIA: ICD-10-CM

## 2023-03-09 DIAGNOSIS — R07.9 CHEST PAIN, UNSPECIFIED TYPE: Primary | ICD-10-CM

## 2023-03-09 PROBLEM — R74.8 ELEVATED LIVER ENZYMES: Status: ACTIVE | Noted: 2020-09-14

## 2023-03-09 LAB
ALBUMIN SERPL-MCNC: 4.3 G/DL (ref 3.5–5.2)
ALBUMIN/GLOB SERPL: 1.1 G/DL
ALP SERPL-CCNC: 114 U/L (ref 39–117)
ALT SERPL W P-5'-P-CCNC: 19 U/L (ref 1–33)
ANION GAP SERPL CALCULATED.3IONS-SCNC: 10.8 MMOL/L (ref 5–15)
AST SERPL-CCNC: 24 U/L (ref 1–32)
BILIRUB SERPL-MCNC: 0.3 MG/DL (ref 0–1.2)
BUN SERPL-MCNC: 12 MG/DL (ref 8–23)
BUN/CREAT SERPL: 17.9 (ref 7–25)
CALCIUM SPEC-SCNC: 9.4 MG/DL (ref 8.6–10.5)
CHLORIDE SERPL-SCNC: 101 MMOL/L (ref 98–107)
CHOLEST SERPL-MCNC: 196 MG/DL (ref 0–200)
CO2 SERPL-SCNC: 27.2 MMOL/L (ref 22–29)
CREAT SERPL-MCNC: 0.67 MG/DL (ref 0.57–1)
EGFRCR SERPLBLD CKD-EPI 2021: 98.4 ML/MIN/1.73
GLOBULIN UR ELPH-MCNC: 3.8 GM/DL
GLUCOSE SERPL-MCNC: 102 MG/DL (ref 65–99)
HDLC SERPL-MCNC: 63 MG/DL (ref 40–60)
LDLC SERPL CALC-MCNC: 108 MG/DL (ref 0–100)
LDLC/HDLC SERPL: 1.66 {RATIO}
POTASSIUM SERPL-SCNC: 3.9 MMOL/L (ref 3.5–5.2)
PROT SERPL-MCNC: 8.1 G/DL (ref 6–8.5)
SODIUM SERPL-SCNC: 139 MMOL/L (ref 136–145)
TRIGL SERPL-MCNC: 142 MG/DL (ref 0–150)
VLDLC SERPL-MCNC: 25 MG/DL (ref 5–40)

## 2023-03-09 PROCEDURE — 80053 COMPREHEN METABOLIC PANEL: CPT | Performed by: PHYSICIAN ASSISTANT

## 2023-03-09 PROCEDURE — 99214 OFFICE O/P EST MOD 30 MIN: CPT | Performed by: PHYSICIAN ASSISTANT

## 2023-03-09 PROCEDURE — 80061 LIPID PANEL: CPT | Performed by: PHYSICIAN ASSISTANT

## 2023-03-09 RX ORDER — PHENOL 1.4 %
600 AEROSOL, SPRAY (ML) MUCOUS MEMBRANE DAILY
COMMUNITY

## 2023-03-09 RX ORDER — BUDESONIDE 1 MG/2ML
1 INHALANT ORAL
COMMUNITY

## 2023-03-09 RX ORDER — CETIRIZINE HYDROCHLORIDE 10 MG/1
10 TABLET ORAL DAILY
COMMUNITY

## 2023-03-09 RX ORDER — HYDROXYZINE HYDROCHLORIDE 10 MG/1
10 TABLET, FILM COATED ORAL 3 TIMES DAILY PRN
COMMUNITY

## 2023-03-09 RX ORDER — NITROGLYCERIN 0.4 MG/1
TABLET SUBLINGUAL
Qty: 25 TABLET | Refills: 11 | Status: SHIPPED | OUTPATIENT
Start: 2023-03-09

## 2023-03-09 RX ORDER — NITROGLYCERIN 0.4 MG/1
TABLET SUBLINGUAL
Qty: 25 TABLET | Refills: 3 | Status: SHIPPED | OUTPATIENT
Start: 2023-03-09

## 2023-03-09 RX ORDER — PROMETHAZINE HYDROCHLORIDE 25 MG/1
25 TABLET ORAL EVERY 6 HOURS PRN
COMMUNITY

## 2023-03-09 RX ORDER — HYDROCODONE BITARTRATE AND ACETAMINOPHEN 7.5; 325 MG/1; MG/1
TABLET ORAL
COMMUNITY
Start: 2023-01-27 | End: 2023-03-09 | Stop reason: ALTCHOICE

## 2023-03-09 RX ORDER — METHOCARBAMOL 500 MG/1
500 TABLET, FILM COATED ORAL
COMMUNITY
Start: 2022-09-26 | End: 2023-03-09 | Stop reason: ALTCHOICE

## 2023-03-09 NOTE — PROGRESS NOTES
Subjective   Nikki Nguyen is a 62 y.o. female     Chief Complaint   Patient presents with   • Follow-up     Problem list   1.  Chest pain  1.1 stress test February 2020 demonstrated normal regular treadmill stress test without abnormality  2.  Preserved systolic function  3.  Palpitations  3.1 event monitor January 2020 showed no significant arrhythmia or ectopy  4.  COPD and asthma     HPI    Patient is a 62-year-old female who presents to the office to be evaluated.    Patient since her last visit has been complaining of discomfort feeling a pressure sensation and uncomfortable sensation in the substernal region.  She has experienced this intermittently.  She has had a degree of dyspnea when exerting or trying to do activity.  She does not describe PND orthopnea.    She also palpitations.  She will feel her heart racing at times.  She does not describe dizziness, presyncope or syncope.  She is stable otherwise.              Current Outpatient Medications on File Prior to Visit   Medication Sig Dispense Refill   • budesonide (PULMICORT) 1 MG/2ML nebulizer solution Take 2 mL by nebulization Daily.     • calcium carbonate (OS-BELEM) 600 MG tablet Take 1 tablet by mouth Daily.     • cetirizine (zyrTEC) 10 MG tablet Take 1 tablet by mouth Daily.     • Cholecalciferol (VITAMIN D3) 50 MCG (2000 UT) tablet Take 1 tablet by mouth Daily.     • estradiol (CLIMARA) 0.025 MG/24HR patch Place 1 patch on the skin as directed by provider 1 (One) Time Per Week.     • hydrOXYzine (ATARAX) 10 MG tablet Take 1 tablet by mouth 3 (Three) Times a Day As Needed for Itching.     • loperamide (IMODIUM) 2 MG capsule Take 1 capsule by mouth Daily As Needed.     • montelukast (SINGULAIR) 10 MG tablet Take 1 tablet by mouth Every Night.     • omeprazole (priLOSEC) 20 MG capsule Take 1 capsule by mouth Daily.     • promethazine (PHENERGAN) 25 MG tablet Take 1 tablet by mouth Every 6 (Six) Hours As Needed for Nausea or Vomiting.     •  [DISCONTINUED] nitroglycerin (NITROSTAT) 0.4 MG SL tablet 1 under the tongue as needed for angina, may repeat q5mins for up three doses 25 tablet 3   • [DISCONTINUED] fexofenadine (ALLEGRA) 180 MG tablet Take 1 tablet by mouth Daily.     • [DISCONTINUED] Fluticasone Furoate-Vilanterol (BREO ELLIPTA) 100-25 MCG/INH inhaler Inhale 1 puff Daily.     • [DISCONTINUED] HYDROcodone-acetaminophen (NORCO) 7.5-325 MG per tablet      • [DISCONTINUED] Lactobacillus (PROBIOTIC GOLD EXTRA STRENGTH PO) Take 1 tablet by mouth 2 (Two) Times a Day.     • [DISCONTINUED] Loratadine (CLARITIN PO) Take  by mouth.     • [DISCONTINUED] methocarbamol (ROBAXIN) 500 MG tablet Take 1 tablet by mouth.     • [DISCONTINUED] nystatin (MYCOSTATIN) 309233 UNIT/ML suspension 5 mL Every 6 (Six) Hours.     • [DISCONTINUED] Prenatal Vit-Fe Fumarate-FA (PRENATAL 19 PO) Take 1 tablet by mouth Daily.     • [DISCONTINUED] triamcinolone (KENALOG) 0.1 % cream Apply 1 application topically to the appropriate area as directed 2 (Two) Times a Day.       No current facility-administered medications on file prior to visit.       Phenyleph-doxylamine-dm-apap and Mometasone    Past Medical History:   Diagnosis Date   • Acid reflux    • Asthma    • COPD (chronic obstructive pulmonary disease) (HCC)        Social History     Socioeconomic History   • Marital status:    Tobacco Use   • Smoking status: Never   • Smokeless tobacco: Never   Substance and Sexual Activity   • Alcohol use: Never   • Drug use: Never   • Sexual activity: Defer       Family History   Problem Relation Age of Onset   • Hypertension Mother    • Diabetes Mother    • Heart disease Mother    • Cirrhosis Father    • Prostate cancer Brother        Review of Systems   Constitutional: Negative.  Negative for chills and fever.   HENT: Negative.  Negative for congestion and sinus pressure.    Respiratory: Positive for shortness of breath. Negative for chest tightness.    Cardiovascular: Positive for  "chest pain (at times), palpitations (flutters ,races) and leg swelling.   Gastrointestinal: Negative.  Negative for abdominal pain, blood in stool, constipation, diarrhea, nausea and vomiting.   Genitourinary: Negative.  Negative for dysuria, frequency, hematuria and urgency.   Musculoskeletal: Positive for back pain and neck pain.   Neurological: Negative.  Negative for dizziness, syncope and light-headedness.   Psychiatric/Behavioral: Positive for sleep disturbance (wakes at times with chest pain ).       Objective   Vitals:    03/09/23 0925   BP: 109/71   BP Location: Left arm   Patient Position: Sitting   Pulse: 94   SpO2: 96%   Weight: 58.5 kg (129 lb)   Height: 160 cm (63\")      /71 (BP Location: Left arm, Patient Position: Sitting)   Pulse 94   Ht 160 cm (63\")   Wt 58.5 kg (129 lb)   SpO2 96%   BMI 22.85 kg/m²     Lab Results (most recent)     None          Physical Exam  Vitals and nursing note reviewed.   Constitutional:       General: She is not in acute distress.     Appearance: Normal appearance. She is well-developed.   HENT:      Head: Normocephalic and atraumatic.   Eyes:      General: No scleral icterus.        Right eye: No discharge.         Left eye: No discharge.      Conjunctiva/sclera: Conjunctivae normal.   Neck:      Vascular: No carotid bruit.   Cardiovascular:      Rate and Rhythm: Normal rate and regular rhythm.      Heart sounds: Normal heart sounds. No murmur heard.    No friction rub. No gallop.   Pulmonary:      Effort: Pulmonary effort is normal. No respiratory distress.      Breath sounds: Normal breath sounds. No wheezing or rales.   Chest:      Chest wall: No tenderness.   Musculoskeletal:      Right lower leg: No edema.      Left lower leg: No edema.   Skin:     General: Skin is warm and dry.      Coloration: Skin is not pale.      Findings: No erythema or rash.   Neurological:      Mental Status: She is alert and oriented to person, place, and time.      Cranial " Nerves: No cranial nerve deficit.   Psychiatric:         Behavior: Behavior normal.         Procedure   Procedures       Assessment & Plan     Problems Addressed this Visit        Cardiac and Vasculature    Palpitations    Relevant Orders    Adult Transthoracic Echo Complete W/ Cont if Necessary Per Protocol    Stress Test With Myocardial Perfusion One Day    Cardiac Event Monitor    Comprehensive Metabolic Panel    Lipid Panel    Chest pain - Primary    Relevant Orders    Adult Transthoracic Echo Complete W/ Cont if Necessary Per Protocol    Stress Test With Myocardial Perfusion One Day    Cardiac Event Monitor    Comprehensive Metabolic Panel    Lipid Panel    Dyslipidemia    Relevant Orders    Comprehensive Metabolic Panel    Lipid Panel       Pulmonary and Pneumonias    Shortness of breath    Relevant Orders    Adult Transthoracic Echo Complete W/ Cont if Necessary Per Protocol    Stress Test With Myocardial Perfusion One Day    Cardiac Event Monitor    Comprehensive Metabolic Panel    Lipid Panel   Diagnoses       Codes Comments    Chest pain, unspecified type    -  Primary ICD-10-CM: R07.9  ICD-9-CM: 786.50     Shortness of breath     ICD-10-CM: R06.02  ICD-9-CM: 786.05     Palpitations     ICD-10-CM: R00.2  ICD-9-CM: 785.1     Dyslipidemia     ICD-10-CM: E78.5  ICD-9-CM: 272.4         Recommendation  1.  Patient is a 62-year-old female who presents to the office for evaluation with complaints of chest pain, dyspnea and palpitations.  She would like to have cardiac evaluation performed.    2.  Patient has been experiencing symptoms and I will schedule for stress test for ischemia assessment.    3.  Echo to evaluate LV systolic and diastolic function, valvular structures etc.    4.  Event monitor for arrhythmic evaluation.    5.  We will prescribe nitroglycerin as needed for chest pain.  Any chest pain, noticeable nitroglycerin, I recommend ER evaluation.  We will see back for follow-up after testing.   Follow-up with primary as scheduled.             Nikki Nguyen  reports that she has never smoked. She has never used smokeless tobacco..     Patient brought in medicine list to appointment, it's been reviewed with patient and med list was updated in the chart.        BMI is within normal parameters. No other follow-up for BMI required.       Electronically signed by:

## 2023-03-10 ENCOUNTER — TELEPHONE (OUTPATIENT)
Dept: CARDIOLOGY | Facility: CLINIC | Age: 63
End: 2023-03-10
Payer: COMMERCIAL

## 2023-03-10 NOTE — TELEPHONE ENCOUNTER
Patient informed of lipid results, patient verbalized understanding. Grecia VYAS LPN      ----- Message from KHLOE Simmons sent at 3/9/2023  4:10 PM EST -----  Overall, her cholesterol looks fine.  I would make no changes at this point

## 2023-03-16 DIAGNOSIS — R07.9 CHEST PAIN, UNSPECIFIED TYPE: Primary | ICD-10-CM

## 2023-03-16 DIAGNOSIS — R06.02 SHORTNESS OF BREATH: ICD-10-CM

## 2023-03-16 DIAGNOSIS — R00.2 PALPITATIONS: ICD-10-CM

## 2023-03-30 ENCOUNTER — APPOINTMENT (OUTPATIENT)
Dept: CARDIOLOGY | Facility: HOSPITAL | Age: 63
End: 2023-03-30
Payer: COMMERCIAL

## 2023-03-30 ENCOUNTER — HOSPITAL ENCOUNTER (OUTPATIENT)
Dept: CARDIOLOGY | Facility: HOSPITAL | Age: 63
Discharge: HOME | End: 2023-03-30
Payer: COMMERCIAL

## 2023-03-30 DIAGNOSIS — R06.02 SHORTNESS OF BREATH: ICD-10-CM

## 2023-03-30 DIAGNOSIS — R00.2 PALPITATIONS: ICD-10-CM

## 2023-03-30 DIAGNOSIS — R07.9 CHEST PAIN, UNSPECIFIED TYPE: ICD-10-CM

## 2023-03-30 LAB
BH CV ECHO MEAS - ACS: 1.83 CM
BH CV ECHO MEAS - AO MAX PG: 3.8 MMHG
BH CV ECHO MEAS - AO MEAN PG: 2.42 MMHG
BH CV ECHO MEAS - AO ROOT DIAM: 2.7 CM
BH CV ECHO MEAS - AO V2 MAX: 97.7 CM/SEC
BH CV ECHO MEAS - AO V2 VTI: 25.5 CM
BH CV ECHO MEAS - EDV(CUBED): 89.3 ML
BH CV ECHO MEAS - EDV(MOD-SP4): 61.4 ML
BH CV ECHO MEAS - EF(MOD-SP4): 48.4 %
BH CV ECHO MEAS - EF_3D-VOL: 60 %
BH CV ECHO MEAS - ESV(CUBED): 15.4 ML
BH CV ECHO MEAS - ESV(MOD-SP4): 31.7 ML
BH CV ECHO MEAS - FS: 44.3 %
BH CV ECHO MEAS - IVS/LVPW: 1.06 CM
BH CV ECHO MEAS - IVSD: 0.95 CM
BH CV ECHO MEAS - LA DIMENSION: 3.3 CM
BH CV ECHO MEAS - LAT PEAK E' VEL: 11 CM/SEC
BH CV ECHO MEAS - LV DIASTOLIC VOL/BSA (35-75): 38.3 CM2
BH CV ECHO MEAS - LV MASS(C)D: 136.1 GRAMS
BH CV ECHO MEAS - LV SYSTOLIC VOL/BSA (12-30): 19.8 CM2
BH CV ECHO MEAS - LVIDD: 4.5 CM
BH CV ECHO MEAS - LVIDS: 2.49 CM
BH CV ECHO MEAS - LVPWD: 0.9 CM
BH CV ECHO MEAS - MED PEAK E' VEL: 9.5 CM/SEC
BH CV ECHO MEAS - MV A MAX VEL: 78 CM/SEC
BH CV ECHO MEAS - MV DEC SLOPE: 396.4 CM/SEC2
BH CV ECHO MEAS - MV E MAX VEL: 91.3 CM/SEC
BH CV ECHO MEAS - MV E/A: 1.17
BH CV ECHO MEAS - RAP SYSTOLE: 10 MMHG
BH CV ECHO MEAS - RVDD: 2.33 CM
BH CV ECHO MEAS - RVSP: 32.1 MMHG
BH CV ECHO MEAS - SI(MOD-SP4): 18.5 ML/M2
BH CV ECHO MEAS - SV(MOD-SP4): 29.7 ML
BH CV ECHO MEAS - TR MAX PG: 22.1 MMHG
BH CV ECHO MEAS - TR MAX VEL: 234.8 CM/SEC
BH CV ECHO MEASUREMENTS AVERAGE E/E' RATIO: 8.91
LEFT ATRIUM VOLUME INDEX: 15.3 ML/M2
MAXIMAL PREDICTED HEART RATE: 157 BPM
STRESS TARGET HR: 133 BPM

## 2023-03-30 PROCEDURE — 93306 TTE W/DOPPLER COMPLETE: CPT

## 2023-03-30 PROCEDURE — 93018 CV STRESS TEST I&R ONLY: CPT | Performed by: INTERNAL MEDICINE

## 2023-03-30 PROCEDURE — 93017 CV STRESS TEST TRACING ONLY: CPT

## 2023-04-03 LAB
BH CV STRESS DURATION MIN STAGE 1: 3
BH CV STRESS DURATION SEC STAGE 1: 0
BH CV STRESS GRADE STAGE 1: 10
BH CV STRESS METS STAGE 1: 5
BH CV STRESS PROTOCOL 1: NORMAL
BH CV STRESS RECOVERY BP: NORMAL MMHG
BH CV STRESS RECOVERY HR: 74 BPM
BH CV STRESS SPEED STAGE 1: 1.7
BH CV STRESS STAGE 1: 1
MAXIMAL PREDICTED HEART RATE: 157 BPM
PERCENT MAX PREDICTED HR: 85.35 %
STRESS BASELINE BP: NORMAL MMHG
STRESS BASELINE HR: 66 BPM
STRESS PERCENT HR: 100 %
STRESS POST ESTIMATED WORKLOAD: 4.6 METS
STRESS POST EXERCISE DUR MIN: 3 MIN
STRESS POST PEAK BP: NORMAL MMHG
STRESS POST PEAK HR: 134 BPM
STRESS TARGET HR: 133 BPM

## 2023-04-06 ENCOUNTER — TELEPHONE (OUTPATIENT)
Dept: CARDIOLOGY | Facility: CLINIC | Age: 63
End: 2023-04-06
Payer: COMMERCIAL

## 2023-04-06 NOTE — TELEPHONE ENCOUNTER
STRESS  Pt notified of no acute findings. Provider will discuss results at f/u. Pt reminded of appt date and time.  ----- Message from KHLOE Simmons sent at 4/4/2023  4:02 PM EDT -----  Routine follow-up

## 2023-04-18 ENCOUNTER — TELEPHONE (OUTPATIENT)
Dept: CARDIOLOGY | Facility: CLINIC | Age: 63
End: 2023-04-18
Payer: COMMERCIAL

## 2023-04-18 NOTE — TELEPHONE ENCOUNTER
MONITOR  Called patient to notify of no acute findings or abnormalities. Keep follow up as scheduled. If you have any problem between now and then give our office a call.   ----- Message from KHLOE Simmons sent at 4/17/2023  1:59 PM EDT -----  Routine follow-up

## 2023-04-26 ENCOUNTER — TELEPHONE (OUTPATIENT)
Dept: CARDIOLOGY | Facility: CLINIC | Age: 63
End: 2023-04-26
Payer: COMMERCIAL

## 2023-04-26 NOTE — TELEPHONE ENCOUNTER
ECHO  Pt notified of no acute findings. Provider will discuss results at f/u. Pt reminded of appt date and time.  ----- Message from KHLOE Simmons sent at 4/25/2023  1:14 PM EDT -----  Routine follow-up

## 2023-10-11 ENCOUNTER — OFFICE VISIT (OUTPATIENT)
Dept: CARDIOLOGY | Facility: CLINIC | Age: 63
End: 2023-10-11
Payer: COMMERCIAL

## 2023-10-11 VITALS
HEART RATE: 81 BPM | DIASTOLIC BLOOD PRESSURE: 63 MMHG | BODY MASS INDEX: 22.86 KG/M2 | WEIGHT: 129 LBS | SYSTOLIC BLOOD PRESSURE: 108 MMHG | OXYGEN SATURATION: 98 % | HEIGHT: 63 IN

## 2023-10-11 DIAGNOSIS — R00.2 PALPITATIONS: ICD-10-CM

## 2023-10-11 DIAGNOSIS — R07.9 CHEST PAIN, UNSPECIFIED TYPE: Primary | ICD-10-CM

## 2023-10-11 DIAGNOSIS — R06.02 SHORTNESS OF BREATH: ICD-10-CM

## 2023-10-11 PROCEDURE — 99214 OFFICE O/P EST MOD 30 MIN: CPT | Performed by: PHYSICIAN ASSISTANT

## 2023-10-11 RX ORDER — FLUTICASONE PROPIONATE AND SALMETEROL XINAFOATE 230; 21 UG/1; UG/1
2 AEROSOL, METERED RESPIRATORY (INHALATION) 2 TIMES DAILY
COMMUNITY

## 2023-10-11 RX ORDER — OFLOXACIN 3 MG/ML
1 SOLUTION/ DROPS OPHTHALMIC 4 TIMES DAILY
COMMUNITY

## 2023-10-11 RX ORDER — ALBUTEROL SULFATE 90 UG/1
2 AEROSOL, METERED RESPIRATORY (INHALATION) EVERY 4 HOURS PRN
COMMUNITY

## 2023-10-11 NOTE — PROGRESS NOTES
Problem list     Subjective   Nikki Nguyen is a 62 y.o. female     Chief Complaint   Patient presents with    Follow-up     7 months     Problem list   1.  Chest pain  1.1 stress test February 2020 demonstrated normal regular treadmill stress test without abnormality  2.  Preserved systolic function  3.  Palpitations  3.1 event monitor January 2020 showed no significant arrhythmia or ectopy  4.  COPD and asthma  HPI    Patient is a 62-year-old female who presents to the office to be evaluated.  Earlier in the year she went testing to include stress test which was negative for ischemia.  It was a regular treadmill stress test.  Echo was largely benign with normal systolic function and an event monitor without any arrhythmic symptoms straight noted.    She feels well.  She continues to have discomfort in the substernal region but describes some GI symptoms.  She describes having some dysphagia.  She has not seen GI services in a while.  She feels her symptoms into the substernal region.  She has a degree of dyspnea that is mild but does not really describe severe exertional shortness of breath.  No PND or orthopnea.    She still occasionally palpitate.  She does not describe any strokelike symptoms.  No complaints of dizziness, presyncope or syncope.  She is stable otherwise.      Current Outpatient Medications on File Prior to Visit   Medication Sig Dispense Refill    albuterol sulfate  (90 Base) MCG/ACT inhaler Inhale 2 puffs Every 4 (Four) Hours As Needed for Wheezing.      budesonide (PULMICORT) 1 MG/2ML nebulizer solution Take 2 mL by nebulization Daily.      calcium carbonate (OS-BELEM) 600 MG tablet Take 1 tablet by mouth Daily.      cetirizine (zyrTEC) 10 MG tablet Take 1 tablet by mouth Daily.      Cholecalciferol (VITAMIN D3) 50 MCG (2000 UT) tablet Take 1 tablet by mouth Daily.      DORZOLAMIDE HCL OP Apply  to eye(s) as directed by provider.      estradiol (CLIMARA) 0.025 MG/24HR patch Place 1 patch  on the skin as directed by provider 1 (One) Time Per Week.      fluticasone-salmeterol (ADVAIR HFA) 230-21 MCG/ACT inhaler Inhale 2 puffs 2 (Two) Times a Day.      hydrOXYzine (ATARAX) 10 MG tablet Take 1 tablet by mouth 3 (Three) Times a Day As Needed for Itching.      loperamide (IMODIUM) 2 MG capsule Take 1 capsule by mouth Daily As Needed.      montelukast (SINGULAIR) 10 MG tablet Take 1 tablet by mouth Every Night.      nitroglycerin (NITROSTAT) 0.4 MG SL tablet 1 under the tongue as needed for angina, may repeat q5mins for up three doses 25 tablet 3    nitroglycerin (NITROSTAT) 0.4 MG SL tablet 1 under the tongue as needed for angina, may repeat q5mins for up three doses 25 tablet 11    ofloxacin (OCUFLOX) 0.3 % ophthalmic solution 1 drop 4 (Four) Times a Day.      omeprazole (priLOSEC) 20 MG capsule Take 1 capsule by mouth Daily.      promethazine (PHENERGAN) 25 MG tablet Take 1 tablet by mouth Every 6 (Six) Hours As Needed for Nausea or Vomiting.       No current facility-administered medications on file prior to visit.       Phenyleph-doxylamine-dm-apap and Mometasone    Past Medical History:   Diagnosis Date    Acid reflux     Asthma     COPD (chronic obstructive pulmonary disease)        Social History     Socioeconomic History    Marital status:    Tobacco Use    Smoking status: Never    Smokeless tobacco: Never   Substance and Sexual Activity    Alcohol use: Never    Drug use: Never    Sexual activity: Defer       Family History   Problem Relation Age of Onset    Hypertension Mother     Diabetes Mother     Heart disease Mother     Cirrhosis Father     Prostate cancer Brother        Review of Systems   Constitutional: Negative.    Eyes: Negative.  Negative for visual disturbance.   Respiratory:  Positive for shortness of breath. Negative for apnea, cough, chest tightness and wheezing.    Cardiovascular:  Positive for chest pain and palpitations. Negative for leg swelling.   Gastrointestinal:   "Negative for blood in stool.   Endocrine: Negative.    Genitourinary: Negative.  Negative for hematuria.   Musculoskeletal: Negative.    Skin: Negative.  Negative for color change, rash and wound.   Allergic/Immunologic: Negative.    Neurological:  Positive for headaches. Negative for dizziness, syncope, weakness and light-headedness.   Hematological:  Bruises/bleeds easily.   Psychiatric/Behavioral: Negative.  Negative for sleep disturbance.        Objective   Vitals:    10/11/23 1114   BP: 108/63   BP Location: Left arm   Patient Position: Sitting   Cuff Size: Adult   Pulse: 81   SpO2: 98%   Weight: 58.5 kg (129 lb)   Height: 160 cm (63\")      /63 (BP Location: Left arm, Patient Position: Sitting, Cuff Size: Adult)   Pulse 81   Ht 160 cm (63\")   Wt 58.5 kg (129 lb)   SpO2 98%   BMI 22.85 kg/mý     Lab Results (most recent)       None            Physical Exam  Vitals and nursing note reviewed.   Constitutional:       General: She is not in acute distress.     Appearance: Normal appearance. She is well-developed.   HENT:      Head: Normocephalic and atraumatic.   Eyes:      General: No scleral icterus.        Right eye: No discharge.         Left eye: No discharge.      Conjunctiva/sclera: Conjunctivae normal.   Neck:      Vascular: No carotid bruit.   Cardiovascular:      Rate and Rhythm: Normal rate and regular rhythm.      Heart sounds: Normal heart sounds. No murmur heard.     No friction rub. No gallop.   Pulmonary:      Effort: Pulmonary effort is normal. No respiratory distress.      Breath sounds: Normal breath sounds. No wheezing or rales.   Chest:      Chest wall: No tenderness.   Musculoskeletal:      Right lower leg: No edema.      Left lower leg: No edema.   Skin:     General: Skin is warm and dry.      Coloration: Skin is not pale.      Findings: No erythema or rash.   Neurological:      Mental Status: She is alert and oriented to person, place, and time.      Cranial Nerves: No cranial " nerve deficit.   Psychiatric:         Behavior: Behavior normal.         Procedure   Procedures       Assessment & Plan     Problems Addressed this Visit          Cardiac and Vasculature    Palpitations    Relevant Orders    Stress Test With Myocardial Perfusion One Day    Chest pain - Primary    Relevant Orders    Stress Test With Myocardial Perfusion One Day       Pulmonary and Pneumonias    Shortness of breath    Relevant Orders    Stress Test With Myocardial Perfusion One Day     Diagnoses         Codes Comments    Chest pain, unspecified type    -  Primary ICD-10-CM: R07.9  ICD-9-CM: 786.50     Shortness of breath     ICD-10-CM: R06.02  ICD-9-CM: 786.05     Palpitations     ICD-10-CM: R00.2  ICD-9-CM: 785.1           Recommendation  1.  Patient has atypical chest discomfort I would recommend she see GI services and discussed that with her today.  She is interested in further evaluation and I will schedule nuclear stress test to evaluate and exclude ischemia as a cause of symptoms    2.  Otherwise she has normal systolic function with no findings to suggest failure.  Dyspnea could be related to deconditioning.    3.  Palpitations without any symptoms to suggest malignant arrhythmia.  Event monitor without any significant arrhythmic substrate.  We will start with nuclear stress test.  We will see her back for follow-up on above testing and recommend further.  Follow-up with primary as scheduled.           Patient brought in medicine list to appointment, it's been reviewed with patient and med list was updated in the chart.      Electronically signed by:

## 2023-10-11 NOTE — LETTER
October 11, 2023       No Recipients    Patient: Nikki Nguyen   YOB: 1960   Date of Visit: 10/11/2023       Dear Victorino Cortez MD    Nikki Nguyen was in my office today. Below is a copy of my note.    If you have questions, please do not hesitate to call me. I look forward to following Nikki along with you.         Sincerely,        KHLOE Guzmán        CC:   No Recipients    Problem list     Subjective  Nikki Nguyen is a 62 y.o. female     Chief Complaint   Patient presents with    Follow-up     7 months     Problem list   1.  Chest pain  1.1 stress test February 2020 demonstrated normal regular treadmill stress test without abnormality  2.  Preserved systolic function  3.  Palpitations  3.1 event monitor January 2020 showed no significant arrhythmia or ectopy  4.  COPD and asthma  HPI    Patient is a 62-year-old female who presents to the office to be evaluated.  Earlier in the year she went testing to include stress test which was negative for ischemia.  It was a regular treadmill stress test.  Echo was largely benign with normal systolic function and an event monitor without any arrhythmic symptoms straight noted.    She feels well.  She continues to have discomfort in the substernal region but describes some GI symptoms.  She describes having some dysphagia.  She has not seen GI services in a while.  She feels her symptoms into the substernal region.  She has a degree of dyspnea that is mild but does not really describe severe exertional shortness of breath.  No PND or orthopnea.    She still occasionally palpitate.  She does not describe any strokelike symptoms.  No complaints of dizziness, presyncope or syncope.  She is stable otherwise.      Current Outpatient Medications on File Prior to Visit   Medication Sig Dispense Refill    albuterol sulfate  (90 Base) MCG/ACT inhaler Inhale 2 puffs Every 4 (Four) Hours As Needed for Wheezing.      budesonide (PULMICORT) 1 MG/2ML  nebulizer solution Take 2 mL by nebulization Daily.      calcium carbonate (OS-BELEM) 600 MG tablet Take 1 tablet by mouth Daily.      cetirizine (zyrTEC) 10 MG tablet Take 1 tablet by mouth Daily.      Cholecalciferol (VITAMIN D3) 50 MCG (2000 UT) tablet Take 1 tablet by mouth Daily.      DORZOLAMIDE HCL OP Apply  to eye(s) as directed by provider.      estradiol (CLIMARA) 0.025 MG/24HR patch Place 1 patch on the skin as directed by provider 1 (One) Time Per Week.      fluticasone-salmeterol (ADVAIR HFA) 230-21 MCG/ACT inhaler Inhale 2 puffs 2 (Two) Times a Day.      hydrOXYzine (ATARAX) 10 MG tablet Take 1 tablet by mouth 3 (Three) Times a Day As Needed for Itching.      loperamide (IMODIUM) 2 MG capsule Take 1 capsule by mouth Daily As Needed.      montelukast (SINGULAIR) 10 MG tablet Take 1 tablet by mouth Every Night.      nitroglycerin (NITROSTAT) 0.4 MG SL tablet 1 under the tongue as needed for angina, may repeat q5mins for up three doses 25 tablet 3    nitroglycerin (NITROSTAT) 0.4 MG SL tablet 1 under the tongue as needed for angina, may repeat q5mins for up three doses 25 tablet 11    ofloxacin (OCUFLOX) 0.3 % ophthalmic solution 1 drop 4 (Four) Times a Day.      omeprazole (priLOSEC) 20 MG capsule Take 1 capsule by mouth Daily.      promethazine (PHENERGAN) 25 MG tablet Take 1 tablet by mouth Every 6 (Six) Hours As Needed for Nausea or Vomiting.       No current facility-administered medications on file prior to visit.       Phenyleph-doxylamine-dm-apap and Mometasone    Past Medical History:   Diagnosis Date    Acid reflux     Asthma     COPD (chronic obstructive pulmonary disease)        Social History     Socioeconomic History    Marital status:    Tobacco Use    Smoking status: Never    Smokeless tobacco: Never   Substance and Sexual Activity    Alcohol use: Never    Drug use: Never    Sexual activity: Defer       Family History   Problem Relation Age of Onset     "Hypertension Mother     Diabetes Mother     Heart disease Mother     Cirrhosis Father     Prostate cancer Brother        Review of Systems   Constitutional: Negative.    Eyes: Negative.  Negative for visual disturbance.   Respiratory:  Positive for shortness of breath. Negative for apnea, cough, chest tightness and wheezing.    Cardiovascular:  Positive for chest pain and palpitations. Negative for leg swelling.   Gastrointestinal:  Negative for blood in stool.   Endocrine: Negative.    Genitourinary: Negative.  Negative for hematuria.   Musculoskeletal: Negative.    Skin: Negative.  Negative for color change, rash and wound.   Allergic/Immunologic: Negative.    Neurological:  Positive for headaches. Negative for dizziness, syncope, weakness and light-headedness.   Hematological:  Bruises/bleeds easily.   Psychiatric/Behavioral: Negative.  Negative for sleep disturbance.        Objective  Vitals:    10/11/23 1114   BP: 108/63   BP Location: Left arm   Patient Position: Sitting   Cuff Size: Adult   Pulse: 81   SpO2: 98%   Weight: 58.5 kg (129 lb)   Height: 160 cm (63\")      /63 (BP Location: Left arm, Patient Position: Sitting, Cuff Size: Adult)   Pulse 81   Ht 160 cm (63\")   Wt 58.5 kg (129 lb)   SpO2 98%   BMI 22.85 kg/mý     Lab Results (most recent)       None            Physical Exam  Vitals and nursing note reviewed.   Constitutional:       General: She is not in acute distress.     Appearance: Normal appearance. She is well-developed.   HENT:      Head: Normocephalic and atraumatic.   Eyes:      General: No scleral icterus.        Right eye: No discharge.         Left eye: No discharge.      Conjunctiva/sclera: Conjunctivae normal.   Neck:      Vascular: No carotid bruit.   Cardiovascular:      Rate and Rhythm: Normal rate and regular rhythm.      Heart sounds: Normal heart sounds. No murmur heard.     No friction rub. No gallop.   Pulmonary:      Effort: Pulmonary effort is normal. No " respiratory distress.      Breath sounds: Normal breath sounds. No wheezing or rales.   Chest:      Chest wall: No tenderness.   Musculoskeletal:      Right lower leg: No edema.      Left lower leg: No edema.   Skin:     General: Skin is warm and dry.      Coloration: Skin is not pale.      Findings: No erythema or rash.   Neurological:      Mental Status: She is alert and oriented to person, place, and time.      Cranial Nerves: No cranial nerve deficit.   Psychiatric:         Behavior: Behavior normal.         Procedure  Procedures       Assessment & Plan    Problems Addressed this Visit          Cardiac and Vasculature    Palpitations    Relevant Orders    Stress Test With Myocardial Perfusion One Day    Chest pain - Primary    Relevant Orders    Stress Test With Myocardial Perfusion One Day       Pulmonary and Pneumonias    Shortness of breath    Relevant Orders    Stress Test With Myocardial Perfusion One Day     Diagnoses         Codes Comments    Chest pain, unspecified type    -  Primary ICD-10-CM: R07.9  ICD-9-CM: 786.50     Shortness of breath     ICD-10-CM: R06.02  ICD-9-CM: 786.05     Palpitations     ICD-10-CM: R00.2  ICD-9-CM: 785.1           Recommendation  1.  Patient has atypical chest discomfort I would recommend she see GI services and discussed that with her today.  She is interested in further evaluation and I will schedule nuclear stress test to evaluate and exclude ischemia as a cause of symptoms    2.  Otherwise she has normal systolic function with no findings to suggest failure.  Dyspnea could be related to deconditioning.    3.  Palpitations without any symptoms to suggest malignant arrhythmia.  Event monitor without any significant arrhythmic substrate.  We will start with nuclear stress test.  We will see her back for follow-up on above testing and recommend further.  Follow-up with primary as scheduled.           Patient brought in medicine list to appointment, it's been reviewed with  patient and med list was updated in the chart.      Electronically signed by:

## 2023-11-16 ENCOUNTER — HOSPITAL ENCOUNTER (OUTPATIENT)
Dept: CARDIOLOGY | Facility: HOSPITAL | Age: 63
Discharge: HOME | End: 2023-11-16
Payer: COMMERCIAL

## 2023-11-16 DIAGNOSIS — R06.02 SHORTNESS OF BREATH: ICD-10-CM

## 2023-11-16 DIAGNOSIS — R07.9 CHEST PAIN, UNSPECIFIED TYPE: ICD-10-CM

## 2023-11-16 DIAGNOSIS — R00.2 PALPITATIONS: ICD-10-CM

## 2023-11-16 LAB
BH CV REST NUCLEAR ISOTOPE DOSE: 10 MCI
BH CV STRESS NUCLEAR ISOTOPE DOSE: 30 MCI
BH CV STRESS RECOVERY BP: NORMAL MMHG
BH CV STRESS RECOVERY HR: 91 BPM
MAXIMAL PREDICTED HEART RATE: 156 BPM
PERCENT MAX PREDICTED HR: 93.59 %
STRESS BASELINE BP: NORMAL MMHG
STRESS BASELINE HR: 60 BPM
STRESS PERCENT HR: 110 %
STRESS POST ESTIMATED WORKLOAD: 7 METS
STRESS POST EXERCISE DUR MIN: 4 MIN
STRESS POST EXERCISE DUR SEC: 20 SEC
STRESS POST PEAK BP: NORMAL MMHG
STRESS POST PEAK HR: 146 BPM
STRESS TARGET HR: 133 BPM

## 2023-11-16 PROCEDURE — A9500 TC99M SESTAMIBI: HCPCS | Performed by: INTERNAL MEDICINE

## 2023-11-16 PROCEDURE — 0 TECHNETIUM SESTAMIBI: Performed by: INTERNAL MEDICINE

## 2023-11-16 PROCEDURE — 93017 CV STRESS TEST TRACING ONLY: CPT

## 2023-11-16 PROCEDURE — 78452 HT MUSCLE IMAGE SPECT MULT: CPT

## 2023-11-16 RX ADMIN — TECHNETIUM TC 99M SESTAMIBI 1 DOSE: 1 INJECTION INTRAVENOUS at 08:55

## 2023-11-16 RX ADMIN — TECHNETIUM TC 99M SESTAMIBI 1 DOSE: 1 INJECTION INTRAVENOUS at 10:43

## 2023-11-28 ENCOUNTER — TELEPHONE (OUTPATIENT)
Dept: CARDIOLOGY | Facility: CLINIC | Age: 63
End: 2023-11-28
Payer: COMMERCIAL

## 2023-11-28 NOTE — TELEPHONE ENCOUNTER
STRESS  Pt notified of no acute findings. Provider will discuss results at f/u. Pt reminded of appt date and time.  ----- Message from KHLOE Simmons sent at 11/27/2023  9:13 AM EST -----  Routine follow-up

## 2024-09-03 ENCOUNTER — OFFICE VISIT (OUTPATIENT)
Dept: CARDIOLOGY | Facility: CLINIC | Age: 64
End: 2024-09-03
Payer: COMMERCIAL

## 2024-09-03 VITALS
HEART RATE: 80 BPM | SYSTOLIC BLOOD PRESSURE: 105 MMHG | BODY MASS INDEX: 23.57 KG/M2 | DIASTOLIC BLOOD PRESSURE: 65 MMHG | WEIGHT: 133 LBS | OXYGEN SATURATION: 97 % | HEIGHT: 63 IN

## 2024-09-03 DIAGNOSIS — R07.9 CHEST PAIN, UNSPECIFIED TYPE: ICD-10-CM

## 2024-09-03 DIAGNOSIS — I95.89 CHRONIC HYPOTENSION: ICD-10-CM

## 2024-09-03 DIAGNOSIS — R00.2 PALPITATIONS: Primary | ICD-10-CM

## 2024-09-03 PROCEDURE — 99213 OFFICE O/P EST LOW 20 MIN: CPT | Performed by: PHYSICIAN ASSISTANT

## 2024-09-03 RX ORDER — ALENDRONATE SODIUM 70 MG/1
70 TABLET ORAL
COMMUNITY

## 2024-09-03 RX ORDER — PREGABALIN 25 MG/1
25 CAPSULE ORAL DAILY
COMMUNITY

## 2024-09-03 RX ORDER — ONDANSETRON 4 MG/1
4 TABLET, FILM COATED ORAL EVERY 4 HOURS PRN
COMMUNITY

## 2024-09-03 NOTE — PROGRESS NOTES
Problem list     Subjective   Nikki Nguyen is a 63 y.o. female     Chief Complaint   Patient presents with    Follow-up     Patient states no cardiac issues at this time   Problem list   1.  Chest pain  1.1 stress test February 2020 demonstrated normal regular treadmill stress test without abnormality  1.2 low risk nuclear stress test November 2023 with no evidence of ischemia with a post-stress EF of 51%  2.  Preserved systolic function  3.  Palpitations  3.1 event monitor January 2020 and April 2023 showing no significant arrhythmia or ectopy  4.  COPD and asthma    HPI    Patient is a 63-year-old female presenting back to the office for follow-up.  Last visit, she was complaining of symptoms and was scheduled for nuclear stress test with results as above.    She described that discomfort resolving.  She no longer complains of any chest pain.  No dyspnea.  No PND or orthopnea.    She occasionally notices fluctuations in her heart rate.  She wore an event monitor in 2023 in which no significant arrhythmias were identified.  She feels that this happens intermittently but otherwise is felt well.  She does not describe any strokelike symptoms.  No complaints of dizziness, presyncope or syncope.  She is stable otherwise.      Current Outpatient Medications on File Prior to Visit   Medication Sig Dispense Refill    albuterol sulfate  (90 Base) MCG/ACT inhaler Inhale 2 puffs Every 4 (Four) Hours As Needed for Wheezing.      alendronate (FOSAMAX) 70 MG tablet Take 1 tablet by mouth Every 7 (Seven) Days.      budesonide (PULMICORT) 1 MG/2ML nebulizer solution Take 2 mL by nebulization Daily.      calcium carbonate (OS-BELEM) 600 MG tablet Take 1 tablet by mouth Daily.      cetirizine (zyrTEC) 10 MG tablet Take 1 tablet by mouth Daily.      Cholecalciferol (VITAMIN D3) 50 MCG (2000 UT) tablet Take 1 tablet by mouth Daily.      estradiol (CLIMARA) 0.025 MG/24HR patch Place 1 patch on the skin as directed by provider 1  (One) Time Per Week.      fluticasone-salmeterol (ADVAIR HFA) 230-21 MCG/ACT inhaler Inhale 2 puffs 2 (Two) Times a Day.      Fluticasone-Umeclidin-Vilant (TRELEGY ELLIPTA IN) Inhale.      hydrOXYzine (ATARAX) 10 MG tablet Take 1 tablet by mouth 3 (Three) Times a Day As Needed for Itching.      loperamide (IMODIUM) 2 MG capsule Take 1 capsule by mouth Daily As Needed.      montelukast (SINGULAIR) 10 MG tablet Take 1 tablet by mouth Every Night.      ofloxacin (OCUFLOX) 0.3 % ophthalmic solution 1 drop 4 (Four) Times a Day.      omeprazole (priLOSEC) 20 MG capsule Take 1 capsule by mouth Daily.      ondansetron (ZOFRAN) 4 MG tablet Take 1 tablet by mouth Every 4 (Four) Hours As Needed for Nausea or Vomiting.      pregabalin (LYRICA) 25 MG capsule Take 1 capsule by mouth Daily.      promethazine (PHENERGAN) 25 MG tablet Take 1 tablet by mouth Every 6 (Six) Hours As Needed for Nausea or Vomiting.      [DISCONTINUED] DORZOLAMIDE HCL OP Apply  to eye(s) as directed by provider.      [DISCONTINUED] nitroglycerin (NITROSTAT) 0.4 MG SL tablet 1 under the tongue as needed for angina, may repeat q5mins for up three doses 25 tablet 3    [DISCONTINUED] nitroglycerin (NITROSTAT) 0.4 MG SL tablet 1 under the tongue as needed for angina, may repeat q5mins for up three doses 25 tablet 11     No current facility-administered medications on file prior to visit.       Phenyleph-doxylamine-dm-apap and Mometasone    Past Medical History:   Diagnosis Date    Acid reflux     Asthma     COPD (chronic obstructive pulmonary disease)        Social History     Socioeconomic History    Marital status:    Tobacco Use    Smoking status: Never    Smokeless tobacco: Never   Substance and Sexual Activity    Alcohol use: Never    Drug use: Never    Sexual activity: Defer       Family History   Problem Relation Age of Onset    Hypertension Mother     Diabetes Mother     Heart disease Mother     Cirrhosis Father     Prostate cancer Brother   "      Review of Systems   Constitutional:  Negative for activity change, appetite change, chills, fatigue and fever.   HENT: Negative.  Negative for congestion.    Eyes: Negative.  Negative for visual disturbance.   Respiratory:  Positive for shortness of breath. Negative for apnea, cough, chest tightness and wheezing.    Cardiovascular: Negative.  Negative for chest pain, palpitations and leg swelling.   Gastrointestinal: Negative.  Negative for blood in stool.   Endocrine: Negative.  Negative for cold intolerance and heat intolerance.   Genitourinary: Negative.  Negative for hematuria.   Musculoskeletal: Negative.  Negative for gait problem.   Skin: Negative.  Negative for color change, rash and wound.   Allergic/Immunologic: Negative.  Negative for environmental allergies and food allergies.   Neurological: Negative.  Negative for dizziness, syncope, weakness, light-headedness, numbness and headaches.   Hematological:  Bruises/bleeds easily.   Psychiatric/Behavioral: Negative.  Negative for sleep disturbance.        Objective   Vitals:    09/03/24 1455   BP: 105/65   BP Location: Right arm   Patient Position: Sitting   Cuff Size: Adult   Pulse: 80   SpO2: 97%   Weight: 60.3 kg (133 lb)   Height: 160 cm (63\")      /65 (BP Location: Right arm, Patient Position: Sitting, Cuff Size: Adult)   Pulse 80   Ht 160 cm (63\")   Wt 60.3 kg (133 lb)   SpO2 97%   BMI 23.56 kg/m²     Lab Results (most recent)       None            Physical Exam    Procedure   Procedures       Assessment & Plan     Problems Addressed this Visit          Cardiac and Vasculature    Palpitations - Primary    Chest pain    Chronic hypotension     Diagnoses         Codes Comments    Palpitations    -  Primary ICD-10-CM: R00.2  ICD-9-CM: 785.1     Chest pain, unspecified type     ICD-10-CM: R07.9  ICD-9-CM: 786.50     Chronic hypotension     ICD-10-CM: I95.89  ICD-9-CM: 458.1             Recommendations  1.  Patient is a 63-year-old female " who presents to the office to be evaluated.  She has atypical chest pain in the past and that has resolved.  She no longer complains of any discomfort.  With low risk stress test, we can monitor for now.  If symptoms were to progress or worsen, I want her to call the office.    2.  Patient with chronic hypotension at baseline.  She does not seem to be symptomatic.  For now, we can monitor.    3.  Patient with palpitations at least fluctuations in her heart rate.  We discussed repeat event monitoring but she would like to continue as she is for now.  She does not describe any strokelike symptoms.    4.  We will see her back for follow-up in 6 months to a year or sooner if needed if symptoms were to change as discussed.  Follow-up with primary as scheduled.         Patient brought in medicine list to appointment, it's been reviewed with patient and med list was updated in the chart.         Electronically signed by:

## 2024-09-03 NOTE — LETTER
September 3, 2024       No Recipients    Patient: Nikki Nguyen   YOB: 1960   Date of Visit: 9/3/2024       Dear Victorino Cortez MD    Nikki Nguyen was in my office today. Below is a copy of my note.    If you have questions, please do not hesitate to call me. I look forward to following Nikki along with you.         Sincerely,        KHLOE Guzmán        CC:   No Recipients    Problem list     Subjective  Nikki Nguyen is a 63 y.o. female     Chief Complaint   Patient presents with   • Follow-up     Patient states no cardiac issues at this time   Problem list   1.  Chest pain  1.1 stress test February 2020 demonstrated normal regular treadmill stress test without abnormality  1.2 low risk nuclear stress test November 2023 with no evidence of ischemia with a post-stress EF of 51%  2.  Preserved systolic function  3.  Palpitations  3.1 event monitor January 2020 and April 2023 showing no significant arrhythmia or ectopy  4.  COPD and asthma    HPI    Patient is a 63-year-old female presenting back to the office for follow-up.  Last visit, she was complaining of symptoms and was scheduled for nuclear stress test with results as above.    She described that discomfort resolving.  She no longer complains of any chest pain.  No dyspnea.  No PND or orthopnea.    She occasionally notices fluctuations in her heart rate.  She wore an event monitor in 2023 in which no significant arrhythmias were identified.  She feels that this happens intermittently but otherwise is felt well.  She does not describe any strokelike symptoms.  No complaints of dizziness, presyncope or syncope.  She is stable otherwise.      Current Outpatient Medications on File Prior to Visit   Medication Sig Dispense Refill   • albuterol sulfate  (90 Base) MCG/ACT inhaler Inhale 2 puffs Every 4 (Four) Hours As Needed for Wheezing.     • alendronate (FOSAMAX) 70 MG tablet Take 1 tablet by mouth Every 7 (Seven) Days.     •  budesonide (PULMICORT) 1 MG/2ML nebulizer solution Take 2 mL by nebulization Daily.     • calcium carbonate (OS-BELEM) 600 MG tablet Take 1 tablet by mouth Daily.     • cetirizine (zyrTEC) 10 MG tablet Take 1 tablet by mouth Daily.     • Cholecalciferol (VITAMIN D3) 50 MCG (2000 UT) tablet Take 1 tablet by mouth Daily.     • estradiol (CLIMARA) 0.025 MG/24HR patch Place 1 patch on the skin as directed by provider 1 (One) Time Per Week.     • fluticasone-salmeterol (ADVAIR HFA) 230-21 MCG/ACT inhaler Inhale 2 puffs 2 (Two) Times a Day.     • Fluticasone-Umeclidin-Vilant (TRELEGY ELLIPTA IN) Inhale.     • hydrOXYzine (ATARAX) 10 MG tablet Take 1 tablet by mouth 3 (Three) Times a Day As Needed for Itching.     • loperamide (IMODIUM) 2 MG capsule Take 1 capsule by mouth Daily As Needed.     • montelukast (SINGULAIR) 10 MG tablet Take 1 tablet by mouth Every Night.     • ofloxacin (OCUFLOX) 0.3 % ophthalmic solution 1 drop 4 (Four) Times a Day.     • omeprazole (priLOSEC) 20 MG capsule Take 1 capsule by mouth Daily.     • ondansetron (ZOFRAN) 4 MG tablet Take 1 tablet by mouth Every 4 (Four) Hours As Needed for Nausea or Vomiting.     • pregabalin (LYRICA) 25 MG capsule Take 1 capsule by mouth Daily.     • promethazine (PHENERGAN) 25 MG tablet Take 1 tablet by mouth Every 6 (Six) Hours As Needed for Nausea or Vomiting.     • [DISCONTINUED] DORZOLAMIDE HCL OP Apply  to eye(s) as directed by provider.     • [DISCONTINUED] nitroglycerin (NITROSTAT) 0.4 MG SL tablet 1 under the tongue as needed for angina, may repeat q5mins for up three doses 25 tablet 3   • [DISCONTINUED] nitroglycerin (NITROSTAT) 0.4 MG SL tablet 1 under the tongue as needed for angina, may repeat q5mins for up three doses 25 tablet 11     No current facility-administered medications on file prior to visit.       Phenyleph-doxylamine-dm-apap and Mometasone    Past Medical History:   Diagnosis Date   • Acid reflux    • Asthma    • COPD (chronic obstructive  "pulmonary disease)        Social History     Socioeconomic History   • Marital status:    Tobacco Use   • Smoking status: Never   • Smokeless tobacco: Never   Substance and Sexual Activity   • Alcohol use: Never   • Drug use: Never   • Sexual activity: Defer       Family History   Problem Relation Age of Onset   • Hypertension Mother    • Diabetes Mother    • Heart disease Mother    • Cirrhosis Father    • Prostate cancer Brother        Review of Systems   Constitutional:  Negative for activity change, appetite change, chills, fatigue and fever.   HENT: Negative.  Negative for congestion.    Eyes: Negative.  Negative for visual disturbance.   Respiratory:  Positive for shortness of breath. Negative for apnea, cough, chest tightness and wheezing.    Cardiovascular: Negative.  Negative for chest pain, palpitations and leg swelling.   Gastrointestinal: Negative.  Negative for blood in stool.   Endocrine: Negative.  Negative for cold intolerance and heat intolerance.   Genitourinary: Negative.  Negative for hematuria.   Musculoskeletal: Negative.  Negative for gait problem.   Skin: Negative.  Negative for color change, rash and wound.   Allergic/Immunologic: Negative.  Negative for environmental allergies and food allergies.   Neurological: Negative.  Negative for dizziness, syncope, weakness, light-headedness, numbness and headaches.   Hematological:  Bruises/bleeds easily.   Psychiatric/Behavioral: Negative.  Negative for sleep disturbance.        Objective  Vitals:    09/03/24 1455   BP: 105/65   BP Location: Right arm   Patient Position: Sitting   Cuff Size: Adult   Pulse: 80   SpO2: 97%   Weight: 60.3 kg (133 lb)   Height: 160 cm (63\")      /65 (BP Location: Right arm, Patient Position: Sitting, Cuff Size: Adult)   Pulse 80   Ht 160 cm (63\")   Wt 60.3 kg (133 lb)   SpO2 97%   BMI 23.56 kg/m²     Lab Results (most recent)       None            Physical Exam    Procedure  Procedures       Assessment " & Plan    Problems Addressed this Visit          Cardiac and Vasculature    Palpitations - Primary    Chest pain    Chronic hypotension     Diagnoses         Codes Comments    Palpitations    -  Primary ICD-10-CM: R00.2  ICD-9-CM: 785.1     Chest pain, unspecified type     ICD-10-CM: R07.9  ICD-9-CM: 786.50     Chronic hypotension     ICD-10-CM: I95.89  ICD-9-CM: 458.1             Recommendations  1.  Patient is a 63-year-old female who presents to the office to be evaluated.  She has atypical chest pain in the past and that has resolved.  She no longer complains of any discomfort.  With low risk stress test, we can monitor for now.  If symptoms were to progress or worsen, I want her to call the office.    2.  Patient with chronic hypotension at baseline.  She does not seem to be symptomatic.  For now, we can monitor.    3.  Patient with palpitations at least fluctuations in her heart rate.  We discussed repeat event monitoring but she would like to continue as she is for now.  She does not describe any strokelike symptoms.    4.  We will see her back for follow-up in 6 months to a year or sooner if needed if symptoms were to change as discussed.  Follow-up with primary as scheduled.         Patient brought in medicine list to appointment, it's been reviewed with patient and med list was updated in the chart.         Electronically signed by: